# Patient Record
Sex: MALE | Race: WHITE | Employment: OTHER | URBAN - NONMETROPOLITAN AREA
[De-identification: names, ages, dates, MRNs, and addresses within clinical notes are randomized per-mention and may not be internally consistent; named-entity substitution may affect disease eponyms.]

---

## 2019-05-31 ENCOUNTER — HOSPITAL ENCOUNTER (INPATIENT)
Age: 74
LOS: 3 days | Discharge: ANOTHER HOSPITAL | DRG: 193 | End: 2019-06-03
Attending: FAMILY MEDICINE | Admitting: INTERNAL MEDICINE
Payer: MEDICARE

## 2019-05-31 ENCOUNTER — HOSPITAL ENCOUNTER (EMERGENCY)
Dept: GENERAL RADIOLOGY | Age: 74
Discharge: HOME | DRG: 193 | End: 2019-05-31
Payer: MEDICARE

## 2019-05-31 DIAGNOSIS — I50.9 CONGESTIVE HEART FAILURE, UNSPECIFIED HF CHRONICITY, UNSPECIFIED HEART FAILURE TYPE (HCC): ICD-10-CM

## 2019-05-31 DIAGNOSIS — J18.9 COMMUNITY ACQUIRED PNEUMONIA OF LEFT LOWER LOBE OF LUNG: ICD-10-CM

## 2019-05-31 DIAGNOSIS — R09.02 HYPOXIA: Primary | ICD-10-CM

## 2019-05-31 LAB
BASOPHILS # BLD: 0.7 %
BASOPHILS ABSOLUTE: 0 THOU/MM3 (ref 0–0.1)
BUN WHOLE BLOOD, UC: 26 MG/DL (ref 8–26)
CHLORIDE, WHOLE BLOOD: 98 MEQ/L (ref 98–109)
CO2, WHOLE BLOOD: 26 MEQ/L (ref 23–33)
CREATININE WHOLE BLOOD, UC: 1.2 MG/DL (ref 0.5–1.2)
EOSINOPHIL # BLD: 0.7 %
EOSINOPHILS ABSOLUTE: 0 THOU/MM3 (ref 0–0.4)
FLU A ANTIGEN: NEGATIVE
GFR, ESTIMATED: 63 ML/MIN/1.73M2
GLUCOSE, WHOLE BLOOD: 167 MG/DL (ref 70–108)
HCT VFR BLD CALC: 47.2 % (ref 42–52)
HEMOGLOBIN: 15.5 GM/DL (ref 14–18)
IMMATURE GRANS (ABS): 0.04 THOU/MM3 (ref 0–0.07)
IMMATURE GRANULOCYTES: 0.7 %
INFLUENZA B AG, EIA: NEGATIVE
LYMPHOCYTES # BLD: 23.8 %
LYMPHOCYTES ABSOLUTE: 1.4 THOU/MM3 (ref 1–4.8)
MCH RBC QN AUTO: 30.3 PG (ref 27–31)
MCHC RBC AUTO-ENTMCNC: 32.8 GM/DL (ref 33–37)
MCV RBC AUTO: 92 FL (ref 80–94)
MONOCYTES # BLD: 14.9 %
MONOCYTES ABSOLUTE: 0.9 THOU/MM3 (ref 0.4–1.3)
NUCLEATED RED BLOOD CELLS: 0 /100 WBC
PDW BLD-RTO: 11.9 % (ref 11.5–14.5)
PLATELET # BLD: 160 THOU/MM3 (ref 130–400)
PMV BLD AUTO: 7.8 FL (ref 7.4–10.4)
POTASSIUM, WHOLE BLOOD: 3.7 MEQ/L (ref 3.5–4.9)
PROCALCITONIN: 0.09 NG/ML (ref 0.01–0.09)
RBC # BLD: 5.11 MILL/MM3 (ref 4.7–6.1)
SEG NEUTROPHILS: 59.2 %
SEGMENTED NEUTROPHILS ABSOLUTE COUNT: 3.4 THOU/MM3 (ref 1.8–7.7)
SODIUM, WHOLE BLOOD: 139 MEQ/L (ref 138–146)
WBC # BLD: 5.8 THOU/MM3 (ref 4.8–10.8)

## 2019-05-31 PROCEDURE — 2580000003 HC RX 258: Performed by: INTERNAL MEDICINE

## 2019-05-31 PROCEDURE — 2700000000 HC OXYGEN THERAPY PER DAY

## 2019-05-31 PROCEDURE — 99203 OFFICE O/P NEW LOW 30 MIN: CPT | Performed by: NURSE PRACTITIONER

## 2019-05-31 PROCEDURE — 71046 X-RAY EXAM CHEST 2 VIEWS: CPT

## 2019-05-31 PROCEDURE — 6360000002 HC RX W HCPCS: Performed by: FAMILY MEDICINE

## 2019-05-31 PROCEDURE — 94760 N-INVAS EAR/PLS OXIMETRY 1: CPT

## 2019-05-31 PROCEDURE — 94640 AIRWAY INHALATION TREATMENT: CPT

## 2019-05-31 PROCEDURE — 1200000003 HC TELEMETRY R&B

## 2019-05-31 PROCEDURE — 84145 PROCALCITONIN (PCT): CPT

## 2019-05-31 PROCEDURE — 82565 ASSAY OF CREATININE: CPT

## 2019-05-31 PROCEDURE — 2580000003 HC RX 258: Performed by: FAMILY MEDICINE

## 2019-05-31 PROCEDURE — 36415 COLL VENOUS BLD VENIPUNCTURE: CPT

## 2019-05-31 PROCEDURE — 84520 ASSAY OF UREA NITROGEN: CPT

## 2019-05-31 PROCEDURE — 99223 1ST HOSP IP/OBS HIGH 75: CPT | Performed by: INTERNAL MEDICINE

## 2019-05-31 PROCEDURE — 85025 COMPLETE CBC W/AUTO DIFF WBC: CPT

## 2019-05-31 PROCEDURE — 99284 EMERGENCY DEPT VISIT MOD MDM: CPT

## 2019-05-31 PROCEDURE — 99205 OFFICE O/P NEW HI 60 MIN: CPT

## 2019-05-31 PROCEDURE — 87040 BLOOD CULTURE FOR BACTERIA: CPT

## 2019-05-31 PROCEDURE — 2709999900 HC NON-CHARGEABLE SUPPLY

## 2019-05-31 PROCEDURE — 6360000002 HC RX W HCPCS: Performed by: INTERNAL MEDICINE

## 2019-05-31 PROCEDURE — 80051 ELECTROLYTE PANEL: CPT

## 2019-05-31 PROCEDURE — 87804 INFLUENZA ASSAY W/OPTIC: CPT

## 2019-05-31 PROCEDURE — 6370000000 HC RX 637 (ALT 250 FOR IP): Performed by: NURSE PRACTITIONER

## 2019-05-31 PROCEDURE — 82947 ASSAY GLUCOSE BLOOD QUANT: CPT

## 2019-05-31 PROCEDURE — 6370000000 HC RX 637 (ALT 250 FOR IP): Performed by: INTERNAL MEDICINE

## 2019-05-31 PROCEDURE — 6360000002 HC RX W HCPCS: Performed by: NURSE PRACTITIONER

## 2019-05-31 RX ORDER — POTASSIUM CHLORIDE 20 MEQ/1
40 TABLET, EXTENDED RELEASE ORAL PRN
Status: DISCONTINUED | OUTPATIENT
Start: 2019-05-31 | End: 2019-06-03 | Stop reason: HOSPADM

## 2019-05-31 RX ORDER — ONDANSETRON 2 MG/ML
4 INJECTION INTRAMUSCULAR; INTRAVENOUS EVERY 6 HOURS PRN
Status: DISCONTINUED | OUTPATIENT
Start: 2019-05-31 | End: 2019-06-03 | Stop reason: HOSPADM

## 2019-05-31 RX ORDER — TERAZOSIN 1 MG/1
2 CAPSULE ORAL NIGHTLY
Status: DISCONTINUED | OUTPATIENT
Start: 2019-06-01 | End: 2019-06-03 | Stop reason: HOSPADM

## 2019-05-31 RX ORDER — ALBUTEROL SULFATE 2.5 MG/3ML
2.5 SOLUTION RESPIRATORY (INHALATION) ONCE
Status: COMPLETED | OUTPATIENT
Start: 2019-05-31 | End: 2019-05-31

## 2019-05-31 RX ORDER — SODIUM CHLORIDE 0.9 % (FLUSH) 0.9 %
10 SYRINGE (ML) INJECTION EVERY 12 HOURS SCHEDULED
Status: DISCONTINUED | OUTPATIENT
Start: 2019-05-31 | End: 2019-06-03 | Stop reason: HOSPADM

## 2019-05-31 RX ORDER — FAMOTIDINE 20 MG/1
20 TABLET, FILM COATED ORAL 2 TIMES DAILY
Status: DISCONTINUED | OUTPATIENT
Start: 2019-05-31 | End: 2019-06-03 | Stop reason: HOSPADM

## 2019-05-31 RX ORDER — PRAVASTATIN SODIUM 40 MG
40 TABLET ORAL DAILY
Status: DISCONTINUED | OUTPATIENT
Start: 2019-06-01 | End: 2019-06-03 | Stop reason: HOSPADM

## 2019-05-31 RX ORDER — PRAVASTATIN SODIUM 40 MG
20 TABLET ORAL DAILY
COMMUNITY
End: 2022-09-22

## 2019-05-31 RX ORDER — POTASSIUM CHLORIDE 7.45 MG/ML
10 INJECTION INTRAVENOUS PRN
Status: DISCONTINUED | OUTPATIENT
Start: 2019-05-31 | End: 2019-06-03 | Stop reason: HOSPADM

## 2019-05-31 RX ORDER — POLYETHYLENE GLYCOL 3350 17 G/17G
17 POWDER, FOR SOLUTION ORAL DAILY PRN
Status: DISCONTINUED | OUTPATIENT
Start: 2019-06-01 | End: 2019-06-03 | Stop reason: HOSPADM

## 2019-05-31 RX ORDER — LISINOPRIL 40 MG/1
40 TABLET ORAL DAILY
COMMUNITY
End: 2022-09-22 | Stop reason: SDUPTHER

## 2019-05-31 RX ORDER — ACETAMINOPHEN 325 MG/1
650 TABLET ORAL EVERY 4 HOURS PRN
Status: DISCONTINUED | OUTPATIENT
Start: 2019-05-31 | End: 2019-06-03 | Stop reason: HOSPADM

## 2019-05-31 RX ORDER — IPRATROPIUM BROMIDE AND ALBUTEROL SULFATE 2.5; .5 MG/3ML; MG/3ML
1 SOLUTION RESPIRATORY (INHALATION) ONCE
Status: COMPLETED | OUTPATIENT
Start: 2019-05-31 | End: 2019-05-31

## 2019-05-31 RX ORDER — ATENOLOL 25 MG/1
25 TABLET ORAL DAILY
Status: DISCONTINUED | OUTPATIENT
Start: 2019-06-01 | End: 2019-06-03 | Stop reason: HOSPADM

## 2019-05-31 RX ORDER — TERAZOSIN 2 MG/1
2 CAPSULE ORAL NIGHTLY
COMMUNITY

## 2019-05-31 RX ORDER — LISINOPRIL 40 MG/1
40 TABLET ORAL DAILY
Status: DISCONTINUED | OUTPATIENT
Start: 2019-06-01 | End: 2019-06-03 | Stop reason: HOSPADM

## 2019-05-31 RX ORDER — AMLODIPINE BESYLATE 10 MG/1
10 TABLET ORAL DAILY
Status: DISCONTINUED | OUTPATIENT
Start: 2019-06-01 | End: 2019-06-03 | Stop reason: HOSPADM

## 2019-05-31 RX ORDER — AMLODIPINE BESYLATE 10 MG/1
10 TABLET ORAL DAILY
COMMUNITY
End: 2022-09-22

## 2019-05-31 RX ORDER — METHYLPREDNISOLONE SODIUM SUCCINATE 125 MG/2ML
125 INJECTION, POWDER, LYOPHILIZED, FOR SOLUTION INTRAMUSCULAR; INTRAVENOUS ONCE
Status: COMPLETED | OUTPATIENT
Start: 2019-05-31 | End: 2019-05-31

## 2019-05-31 RX ORDER — SODIUM CHLORIDE 0.9 % (FLUSH) 0.9 %
10 SYRINGE (ML) INJECTION PRN
Status: DISCONTINUED | OUTPATIENT
Start: 2019-05-31 | End: 2019-06-03 | Stop reason: HOSPADM

## 2019-05-31 RX ORDER — ATENOLOL 25 MG/1
25 TABLET ORAL DAILY
COMMUNITY
End: 2019-08-05

## 2019-05-31 RX ORDER — SODIUM CHLORIDE 9 MG/ML
INJECTION, SOLUTION INTRAVENOUS CONTINUOUS
Status: DISCONTINUED | OUTPATIENT
Start: 2019-05-31 | End: 2019-06-02

## 2019-05-31 RX ORDER — IPRATROPIUM BROMIDE AND ALBUTEROL SULFATE 2.5; .5 MG/3ML; MG/3ML
1 SOLUTION RESPIRATORY (INHALATION)
Status: DISCONTINUED | OUTPATIENT
Start: 2019-05-31 | End: 2019-06-03 | Stop reason: HOSPADM

## 2019-05-31 RX ADMIN — IPRATROPIUM BROMIDE AND ALBUTEROL SULFATE 1 AMPULE: .5; 3 SOLUTION RESPIRATORY (INHALATION) at 12:55

## 2019-05-31 RX ADMIN — SODIUM CHLORIDE: 9 INJECTION, SOLUTION INTRAVENOUS at 20:45

## 2019-05-31 RX ADMIN — FAMOTIDINE 20 MG: 20 TABLET ORAL at 20:58

## 2019-05-31 RX ADMIN — CEFTRIAXONE SODIUM 1 G: 1 INJECTION, POWDER, FOR SOLUTION INTRAMUSCULAR; INTRAVENOUS at 16:00

## 2019-05-31 RX ADMIN — ENOXAPARIN SODIUM 40 MG: 40 INJECTION SUBCUTANEOUS at 20:58

## 2019-05-31 RX ADMIN — AZITHROMYCIN MONOHYDRATE 500 MG: 500 INJECTION, POWDER, LYOPHILIZED, FOR SOLUTION INTRAVENOUS at 14:39

## 2019-05-31 RX ADMIN — ALBUTEROL SULFATE 2.5 MG: 2.5 SOLUTION RESPIRATORY (INHALATION) at 14:04

## 2019-05-31 RX ADMIN — IPRATROPIUM BROMIDE AND ALBUTEROL SULFATE 1 AMPULE: .5; 3 SOLUTION RESPIRATORY (INHALATION) at 22:34

## 2019-05-31 RX ADMIN — METHYLPREDNISOLONE SODIUM SUCCINATE 125 MG: 125 INJECTION, POWDER, FOR SOLUTION INTRAMUSCULAR; INTRAVENOUS at 13:06

## 2019-05-31 RX ADMIN — Medication 10 ML: at 20:55

## 2019-05-31 SDOH — HEALTH STABILITY: MENTAL HEALTH: HOW OFTEN DO YOU HAVE A DRINK CONTAINING ALCOHOL?: NEVER

## 2019-05-31 ASSESSMENT — ENCOUNTER SYMPTOMS
DIARRHEA: 0
WHEEZING: 0
VOMITING: 0
ABDOMINAL PAIN: 0
RHINORRHEA: 0
FACIAL SWELLING: 0
STRIDOR: 0
NAUSEA: 0
TROUBLE SWALLOWING: 0
SHORTNESS OF BREATH: 0
BACK PAIN: 0
SINUS CONGESTION: 1
SORE THROAT: 0
SINUS PRESSURE: 0
EYE REDNESS: 0
WHEEZING: 1
VOICE CHANGE: 0
RHINORRHEA: 1
CHEST TIGHTNESS: 1
EYE DISCHARGE: 0
COUGH: 1

## 2019-05-31 NOTE — ED PROVIDER NOTES
[facility]  eMERGENCY dEPARTMENT eNCOUnter          CHIEF COMPLAINT       Chief Complaint   Patient presents with    Cough       Nurses Notes reviewed and I agree except as noted in the HPI. HISTORY OF PRESENT ILLNESS    Enrique Macias is a 75 y.o. male who presents to the Emergency Department via EMS for the evaluation of a cough which the patient reports has been present for the past 5 days. Due to continuation of his cough, the patient states to have gone to urgent care where lab work and a Chest XR was completed which revealed pneumonia. Patient was transferred to the ED for further evaluation and work up. He was treated with solumedrol, a duoneb breathing treatment, and an albuterol breathing treatment before arrival to the ED. Patient was transferred to the ED on 3L of O2 via NC. He states to be experiencing congestion in addition to his cough but denies any rhinorrhea, otalgia, pharyngitis, fever, chills, nausea, vomiting, chest pain, or shortness of breath. No medical history of COPD or asthma is reported. The patient reports no recent travel or exposure to anyone with similar symptoms. The patient reports that his son was diagnosed with legionella one year ago due to washing his farm equipment with well water. Patient reports that his farm equipment was recently watched with the same well water. Patient denies a social history of smoking. The patient reports no additional symptoms or complaints at this time. The HPI was provided by the patient. REVIEW OF SYSTEMS     Review of Systems   Constitutional: Negative for appetite change, chills, fatigue and fever. HENT: Positive for congestion (nasal). Negative for ear pain, rhinorrhea and sore throat. Eyes: Negative for discharge, redness and visual disturbance. Respiratory: Positive for cough. Negative for shortness of breath and wheezing. Cardiovascular: Negative for chest pain, palpitations and leg swelling. °F (37.2 °C). His blood pressure is 133/72 and his pulse is 67. His respiration is 20 and oxygen saturation is 93%. Physical Exam   Constitutional: He is oriented to person, place, and time. He appears well-developed and well-nourished. Non-toxic appearance. Nasal cannula in place. HENT:   Head: Normocephalic and atraumatic. Right Ear: Tympanic membrane and external ear normal.   Left Ear: Tympanic membrane and external ear normal.   Nose: Nose normal.   Mouth/Throat: Oropharynx is clear and moist and mucous membranes are normal. No oropharyngeal exudate, posterior oropharyngeal edema or posterior oropharyngeal erythema. Eyes: Conjunctivae and EOM are normal.   Neck: Normal range of motion. Neck supple. No JVD present. Cardiovascular: Normal rate, regular rhythm, normal heart sounds, intact distal pulses and normal pulses. Exam reveals no gallop and no friction rub. No murmur heard. Pulmonary/Chest: He is in respiratory distress. He has decreased breath sounds. He has wheezes. He has no rhonchi. He has no rales. Abdominal: Soft. Bowel sounds are normal. He exhibits no distension. There is no tenderness. There is no rebound, no guarding and no CVA tenderness. Musculoskeletal: Normal range of motion. He exhibits no edema. Neurological: He is alert and oriented to person, place, and time. He exhibits normal muscle tone. Coordination normal.   Skin: Skin is warm and dry. No rash noted. He is not diaphoretic. Nursing note and vitals reviewed.       DIFFERENTIALDIAGNOSIS:   Include and are not limited to: pneumonia, bronchitis, URI, PE, viral illness    DIAGNOSTICRESULTS     EKG: All EKG's are interpreted by the Emergency Department Physician who either signs or Co-signs this chart in the absence of acardiologist.  EKG interpreted by Beryle Margo, MD:    None    RADIOLOGY: non-plain film images(s) such as CT, Ultrasound and MRI are read by the radiologist.    XR CHEST STANDARD (2 VW)   Final imaging results and presenting respiratory failure I recommended admission and he was amenable to my decision. The case was discussed with Dr. Patricia Deng (internal medicine) who graciously accepts the patient for admission and further evaluation. CRITICAL CARE:   None     CONSULTS:  Dr. Patricia Deng (internal medicine)    PROCEDURES:  None     FINAL IMPRESSION      1. Hypoxia    2. Community acquired pneumonia of left lower lobe of lung [facility])          DISPOSITION/PLAN   Admission    PATIENT REFERRED TO:  [facility] ER  [address] [address]  Today  EMS    Estrellita Hurt MD  [address] [place] [phone]            DISCHARGE MEDICATIONS:  Current Discharge Medication List          (Please note that portions of this note were completed with a voice recognition program.  Efforts weremade to edit the dictations but occasionally words are mis-transcribed.)    Scribe:  Adela Ospina 5/31/19 2:25 PM Scribing for and in thepresence of Madi Umana MD.    Signed by: Kodak Dias, 05/31/19 7:39 PM    Provider:  I personally performed the services described in the documentation, reviewed and edited the documentation which was dictated to the scribe in my presence, and it accurately records my words andactions.     Madi Umana MD 5/31/19 7:39 PM       Madi Umana MD  05/31/19 0055

## 2019-05-31 NOTE — ED NOTES
Patient returned from xray and assessed at 88% on 2L NC. O2 increased to 3L NC continuous pulse ox on patient.  Wife at [facility], RN  05/31/19 0764

## 2019-05-31 NOTE — ED PROVIDER NOTES
[facility]      [address] [facility]       Chief Complaint   Patient presents with    Cough       Nurses Notes reviewed and I agree except as noted in the HPI. HISTORY OF PRESENT ILLNESS   Victor Hugo Shah is a 75 y.o. male who presents: The history is provided by the patient and the spouse. Cough   Cough characteristics:  Productive and hacking  Sputum characteristics:  Yellow  Severity:  Moderate  Onset quality:  Sudden  Duration:  5 days  Timing:  Intermittent  Progression:  Unchanged  Chronicity:  New  Smoker: no    Context: sick contacts (wife seen yesterday in urgent care for Bronchitis)    Relieved by:  Nothing  Worsened by:  Nothing  Ineffective treatments: Coricidin HBP yesterday. Associated symptoms: fever (low grade today 99.7), rhinorrhea, sinus congestion and wheezing    Associated symptoms: no chest pain, no chills, no diaphoresis, no ear fullness, no ear pain, no headaches, no myalgias, no rash, no shortness of breath and no sore throat    Risk factors: no recent infection and no recent travel        REVIEW OF SYSTEMS     Review of Systems   Constitutional: Positive for activity change, fatigue and fever (low grade today 99.7). Negative for appetite change, chills and diaphoresis. HENT: Positive for congestion and rhinorrhea. Negative for ear pain, facial swelling, hearing loss, postnasal drip, sinus pressure, sore throat, trouble swallowing and voice change. Eyes: Negative for visual disturbance. Respiratory: Positive for cough, chest tightness and wheezing. Negative for shortness of breath and stridor. Cardiovascular: Negative for chest pain, palpitations and leg swelling. Gastrointestinal: Negative for abdominal pain, nausea and vomiting. Genitourinary: Negative for dysuria. Musculoskeletal: Negative for arthralgias and myalgias. Skin: Negative for rash. Neurological: Negative for dizziness, numbness and headaches.    Hematological: Negative for adenopathy. Psychiatric/Behavioral: The patient is not nervous/anxious. PAST MEDICAL HISTORY         Diagnosis Date    Stroke [facility])     2008       SURGICAL HISTORY     Patient  has no past surgical history on file. CURRENT MEDICATIONS       Previous Medications    AMLODIPINE (NORVASC) 10 MG TABLET    Take 10 mg by mouth daily    ASPIRIN 325 MG EC TABLET    Take 325 mg by mouth daily    ATENOLOL (TENORMIN) 25 MG TABLET    Take 25 mg by mouth daily    CHLORPHENIRAMINE-ACETAMINOPHEN (CORICIDIN HBP COLD/FLU PO)    Take by mouth    LISINOPRIL (PRINIVIL;ZESTRIL) 40 MG TABLET    Take 40 mg by mouth daily    PRAVASTATIN (PRAVACHOL) 40 MG TABLET    Take 40 mg by mouth daily    TERAZOSIN (HYTRIN) 2 MG CAPSULE    Take 2 mg by mouth nightly       ALLERGIES     Patient is has No Known Allergies. FAMILY HISTORY     Patient's family history is not on file. SOCIAL HISTORY     Patient  reports that he has never smoked. He has never used smokeless tobacco. He reports that he does not drink alcohol or use drugs. PHYSICAL EXAM     ED TRIAGE VITALS  BP: (!) 148/71, Temp: 99.7 °F (37.6 °C), Pulse: 70, Resp: 18, SpO2: 91 %  Physical Exam   Constitutional: He is oriented to person, place, and time. He appears well-developed and well-nourished. Non-toxic appearance. He does not have a sickly appearance. He does not appear ill. No distress. HENT:   Head: Normocephalic and atraumatic. Right Ear: Hearing, tympanic membrane, external ear and ear canal normal.   Left Ear: Hearing, tympanic membrane, external ear and ear canal normal.   Nose: Nose normal.   Mouth/Throat: Uvula is midline, oropharynx is clear and moist and mucous membranes are normal.   Eyes: Right conjunctiva is not injected. Left conjunctiva is not injected. Pupils are equal.   Neck: Normal range of motion. Cardiovascular: Normal rate, regular rhythm and normal heart sounds. No murmur heard.   No extremity edema present Pulmonary/Chest: Effort normal. No accessory muscle usage or stridor. No tachypnea and no bradypnea. No respiratory distress. He has no decreased breath sounds. He has wheezes (expiratory) in the right upper field, the right middle field, the right lower field and the left upper field. He has rhonchi in the left lower field. He has rales. He exhibits no tenderness. Musculoskeletal:        Right knee: He exhibits normal range of motion. Left knee: He exhibits normal range of motion. Neurological: He is alert and oriented to person, place, and time. Skin: Skin is warm and dry. He is not diaphoretic. Psychiatric: He has a normal mood and affect. His behavior is normal.   Nursing note and vitals reviewed. DIAGNOSTIC RESULTS   Labs:  Results for orders placed or performed during the hospital encounter of 05/31/19   Rapid influenza A/B antigens   Result Value Ref Range    Flu A Antigen NEGATIVE NEGATIVE    Influenza B Ag, EIA NEGATIVE NEGATIVE   CBC Auto Differential   Result Value Ref Range    WBC 5.8 4.8 - 10.8 thou/mm3    RBC 5.11 4.70 - 6.10 mill/mm3    Hemoglobin 15.5 14.0 - 18.0 gm/dl    Hematocrit 47.2 42.0 - 52.0 %    MCV 92 80 - 94 fL    MCH 30.3 27.0 - 31.0 pg    MCHC 32.8 (L) 33.0 - 37.0 gm/dl    RDW 11.9 11.5 - 14.5 %    Platelets 260 662 - 964 thou/mm3    MPV 7.8 7.4 - 10.4 fL   POC Basic Metabol Panel without Calcium   Result Value Ref Range    Sodium, Whole Blood 139 138 - 146 meq/l    Potassium, Whole Blood 3.7 3.5 - 4.9 meq/l    Chloride, Whole Blood 98 98 - 109 meq/l    CO2, WHOLE BLOOD 26 23 - 33 meq/l    Glucose, Whole Blood 167 (H) 70 - 108 mg/dl    BUN WHOLE BLOOD, UC 26 8 - 26 mg/dl    CREATININE WHOLE BLOOD, UC 1.2 0.5 - 1.2 mg/dl   Glomerular Filtration Rate, Estimated   Result Value Ref Range    GFR, Estimated 63 ml/min/1.73m2       IMAGING:  XR CHEST STANDARD (2 VW)   Final Result      Left basilar atelectasis/infiltrate.                **This report has been created using voice recognition software. It may contain minor errors which are inherent in voice recognition technology. **      Final report electronically signed by Dr. Jarrod Diaz on 5/31/2019 1:53 PM          URGENT CARE COURSE:     Vitals:    05/31/19 1313 05/31/19 1314 05/31/19 1330 05/31/19 1331   BP:       Pulse:       Resp:  16  18   Temp:       TempSrc:       SpO2: 91% 93% (!) 88% 91%   Weight:       Height:           Medications   albuterol (PROVENTIL) nebulizer solution 2.5 mg (has no administration in time range)   ipratropium-albuterol (DUONEB) nebulizer solution 1 ampule (1 ampule Inhalation Given 5/31/19 1255)   methylPREDNISolone sodium (SOLU-MEDROL) injection 125 mg (125 mg Intramuscular Given 5/31/19 1306)     PROCEDURES:  None  FINAL IMPRESSION       1. Hypoxia    2. Community acquired pneumonia of left lower lobe of lung ([facility] [address]        DISPOSITION/PLAN   DISPOSITION    1347 differentials discussed, need for oxygen and transfer to ER for further evaluation and diagnostic workup discussed as well as possible admission. Wife and patient voiced understanding  After 1 Duoneb aerosol tx scattered wheezing clearing, LLL with faint rhonci and exp wheeze. Requiring oxygen at 3 Liters nasal cannula to maintain O2 sat 92%. When ambulating on room air after initial Duoneb patients sat dropped from 94% to 88%.       1359- CXR read Left basilar infiltrate  Calling report to [facility] ER spoke to [facility]  EMS called out for transfer  Albuterol UD given per aerosol     1407- EMS here for transport , patient stablized prior to transfer    Lab Results   Component Value Date    WBC 5.8 05/31/2019    HGB 15.5 05/31/2019    HCT 47.2 05/31/2019     05/31/2019     Lab Results   Component Value Date     05/31/2019    K 3.7 05/31/2019        PATIENT REFERRED TO:  [facility] ER  [address]  [facility] [place] [id number]  [place]  EMS      DISCHARGE MEDICATIONS:  New Prescriptions    No medications on file

## 2019-05-31 NOTE — H&P
History & Physical        Patient:  Hardin Dancer  YOB: 1945    MRN: [id number]     Acct: [de-identified]    PCP: Elder Zarate MD    Date of Admission: 5/31/2019    Date of Service: Pt seen/examined on 5/31/2019 and Admitted to Inpatient with expected LOS greater than two midnights due to medical therapy. Chief Complaint:    Chief Complaint   Patient presents with    Cough         History Of Present Illness:      75 y.o. male who presented to [address] [place] [address] with \"evaluation of a cough which the patient reports has been present for the past 5 days. Due to continuation of his cough, the patient states to have gone to urgent care where lab work and a Chest XR was completed which revealed pneumonia. Patient was transferred to the ED for further evaluation and work up. He was treated with solumedrol, a duoneb breathing treatment, and an albuterol breathing treatment before arrival to the ED. Patient was transferred to the ED on 3L of O2 via NC. He states to be experiencing congestion in addition to his cough but denies any rhinorrhea, otalgia, pharyngitis, fever, chills, nausea, vomiting, chest pain, or shortness of breath. No medical history of COPD or asthma is reported. The patient reports no recent travel or exposure to anyone with similar symptoms. The patient reports that his son was diagnosed with legionella one year ago due to washing his farm equipment with well water. Patient reports that his farm equipment was recently watched with the same well water. \"-per er note and confirmed by myself    Addendum back deck was pressured washed with well water that has a hx of having legionella in it        Past Medical History:          Diagnosis Date    Stroke [facility])     2008       Past Surgical History:          Procedure Laterality Date    VEIN BYPASS SURGERY         Medications Prior to Admission:      Prior to Admission medications    Medication Sig Start Date End Date Taking? normal bowel sounds. No guarding, rebound. Musculoskeletal:  No clubbing, cyanosis or edema bilaterally. Full range of motion without deformity. Skin: Skin color, texture, turgor normal.  No rashes or lesions, or suspicious lesions. Neurologic:  Neurovascularly intact without any focal sensory/motor deficits. Cranial nerves: II-XII intact, grossly non-focal.  Psychiatric:  Alert and oriented, thought content appropriate, normal insight  Capillary Refill: Brisk,< 2 seconds   Peripheral Pulses: +2 palpable, equal bilaterally upper and lower extremities  Lymphatics: no lymphadenopathy      Labs:     Recent Labs     05/31/19  1331   WBC 5.8   HGB 15.5   HCT 47.2        Recent Labs     05/31/19  1331      K 3.7     No results for input(s): AST, ALT, BILIDIR, BILITOT, ALKPHOS in the last 72 hours. No results for input(s): INR in the last 72 hours. No results for input(s): Madalynn Duyen in the last 72 hours. Urinalysis:    No results found for: Eliel Sellers, BACTERIA, [address] [facility], [facility], [facility], [place] [facility]    Radiology:     CXR: I have reviewed the CXR with the following interpretation: lll infiltrate  EKG:  I have reviewed the EKG with the following interpretation:    Xr Chest Standard (2 Vw)    Result Date: 5/31/2019  PROCEDURE: XR CHEST (2 VW) CLINICAL INFORMATION: Cough and wheezing TECHNIQUE: PA and lateral chest radiographs. COMPARISON: None. FINDINGS: Median sternotomy wires are present in the mid thorax. Cardiomediastinal silhouette is within normal limits. Minimal reticular opacities are seen at the left lung base. There is a calcified granuloma in the left upper lung. Degenerative changes are present in the thoracic spine. Soft tissues are unremarkable. Left basilar atelectasis/infiltrate. **This report has been created using voice recognition software. It may contain minor errors which are inherent in voice recognition technology. ** Final report electronically signed by Dr. Francesco Camacho Travon Giraldo on 5/31/2019 1:53 PM           DVT prophylaxis: [x] Lovenox                                 [] SCDs                                 [] SQ Heparin                                 [] Encourage ambulation           [] Already on Anticoagulation    Code Status: No Order      PT/OT Eval Status:     Disposition:    [x] Home       [] TCU       [] Rehab       [] Psych       [] SNF       [] [place]       [] Other-    ASSESSMENT:    Active Hospital Problems    Diagnosis Date Noted    Pneumonia of lower lobe due to infectious organism ([facility] [address] [J18.1] 05/31/2019    Hypoxia [R09.02] 05/31/2019    Pneumonia [J18.9] 05/31/2019       PLAN:    1. Admit  2. Iv abx  3. Strong suspicion for legionella  4. Resume home meds  5. duonebs  6. Oxygen protocol        Thank you Colin Murillo MD for the opportunity to be involved in this patient's care.     Electronically signed by Mayda Henderson DO on 5/31/2019 at 3:57 PM

## 2019-05-31 NOTE — ED NOTES
Patient respirations 16/min, 2 L NC and wife at tableside. Continuous pulse ox on patient.     Patient to mike via wheelchair on 2L NC     Owen Funk RN  05/31/19 4317

## 2019-06-01 LAB
ANION GAP SERPL CALCULATED.3IONS-SCNC: 14 MEQ/L (ref 8–16)
AVERAGE GLUCOSE: 135 MG/DL (ref 70–126)
BASOPHILS # BLD: 0.1 %
BASOPHILS ABSOLUTE: 0 THOU/MM3 (ref 0–0.1)
BUN BLDV-MCNC: 25 MG/DL (ref 7–22)
CALCIUM SERPL-MCNC: 9 MG/DL (ref 8.5–10.5)
CHLORIDE BLD-SCNC: 101 MEQ/L (ref 98–111)
CO2: 24 MEQ/L (ref 23–33)
CREAT SERPL-MCNC: 0.9 MG/DL (ref 0.4–1.2)
EOSINOPHIL # BLD: 0 %
EOSINOPHILS ABSOLUTE: 0 THOU/MM3 (ref 0–0.4)
ERYTHROCYTE [DISTWIDTH] IN BLOOD BY AUTOMATED COUNT: 13.3 % (ref 11.5–14.5)
ERYTHROCYTE [DISTWIDTH] IN BLOOD BY AUTOMATED COUNT: 45 FL (ref 35–45)
GFR SERPL CREATININE-BSD FRML MDRD: 82 ML/MIN/1.73M2
GLUCOSE BLD-MCNC: 164 MG/DL (ref 70–108)
GLUCOSE BLD-MCNC: 180 MG/DL (ref 70–108)
HBA1C MFR BLD: 6.5 % (ref 4.4–6.4)
HCT VFR BLD CALC: 45.9 % (ref 42–52)
HEMOGLOBIN: 15.4 GM/DL (ref 14–18)
IMMATURE GRANS (ABS): 0.06 THOU/MM3 (ref 0–0.07)
IMMATURE GRANULOCYTES: 0.8 %
LYMPHOCYTES # BLD: 11.4 %
LYMPHOCYTES ABSOLUTE: 0.9 THOU/MM3 (ref 1–4.8)
MAGNESIUM: 2.1 MG/DL (ref 1.6–2.4)
MCH RBC QN AUTO: 30.8 PG (ref 26–33)
MCHC RBC AUTO-ENTMCNC: 33.6 GM/DL (ref 32.2–35.5)
MCV RBC AUTO: 91.8 FL (ref 80–94)
MONOCYTES # BLD: 4.2 %
MONOCYTES ABSOLUTE: 0.3 THOU/MM3 (ref 0.4–1.3)
NUCLEATED RED BLOOD CELLS: 0 /100 WBC
PLATELET # BLD: 173 THOU/MM3 (ref 130–400)
PMV BLD AUTO: 10.3 FL (ref 9.4–12.4)
POTASSIUM REFLEX MAGNESIUM: 3.7 MEQ/L (ref 3.5–5.2)
PROCALCITONIN: 0.08 NG/ML (ref 0.01–0.09)
RBC # BLD: 5 MILL/MM3 (ref 4.7–6.1)
SEG NEUTROPHILS: 83.5 %
SEGMENTED NEUTROPHILS ABSOLUTE COUNT: 6.6 THOU/MM3 (ref 1.8–7.7)
SODIUM BLD-SCNC: 139 MEQ/L (ref 135–145)
WBC # BLD: 7.9 THOU/MM3 (ref 4.8–10.8)

## 2019-06-01 PROCEDURE — 94640 AIRWAY INHALATION TREATMENT: CPT

## 2019-06-01 PROCEDURE — 84145 PROCALCITONIN (PCT): CPT

## 2019-06-01 PROCEDURE — 87449 NOS EACH ORGANISM AG IA: CPT

## 2019-06-01 PROCEDURE — 36415 COLL VENOUS BLD VENIPUNCTURE: CPT

## 2019-06-01 PROCEDURE — 1200000003 HC TELEMETRY R&B

## 2019-06-01 PROCEDURE — 99233 SBSQ HOSP IP/OBS HIGH 50: CPT | Performed by: INTERNAL MEDICINE

## 2019-06-01 PROCEDURE — 80048 BASIC METABOLIC PNL TOTAL CA: CPT

## 2019-06-01 PROCEDURE — 6370000000 HC RX 637 (ALT 250 FOR IP): Performed by: INTERNAL MEDICINE

## 2019-06-01 PROCEDURE — 82948 REAGENT STRIP/BLOOD GLUCOSE: CPT

## 2019-06-01 PROCEDURE — 94761 N-INVAS EAR/PLS OXIMETRY MLT: CPT

## 2019-06-01 PROCEDURE — 2580000003 HC RX 258: Performed by: INTERNAL MEDICINE

## 2019-06-01 PROCEDURE — 2700000000 HC OXYGEN THERAPY PER DAY

## 2019-06-01 PROCEDURE — 85025 COMPLETE CBC W/AUTO DIFF WBC: CPT

## 2019-06-01 PROCEDURE — 6360000002 HC RX W HCPCS: Performed by: INTERNAL MEDICINE

## 2019-06-01 PROCEDURE — 83036 HEMOGLOBIN GLYCOSYLATED A1C: CPT

## 2019-06-01 PROCEDURE — 83735 ASSAY OF MAGNESIUM: CPT

## 2019-06-01 PROCEDURE — 87899 AGENT NOS ASSAY W/OPTIC: CPT

## 2019-06-01 RX ORDER — DEXTROSE MONOHYDRATE 50 MG/ML
100 INJECTION, SOLUTION INTRAVENOUS PRN
Status: DISCONTINUED | OUTPATIENT
Start: 2019-06-01 | End: 2019-06-03 | Stop reason: HOSPADM

## 2019-06-01 RX ORDER — DEXTROSE MONOHYDRATE 25 G/50ML
12.5 INJECTION, SOLUTION INTRAVENOUS PRN
Status: DISCONTINUED | OUTPATIENT
Start: 2019-06-01 | End: 2019-06-03 | Stop reason: HOSPADM

## 2019-06-01 RX ORDER — NICOTINE POLACRILEX 4 MG
15 LOZENGE BUCCAL PRN
Status: DISCONTINUED | OUTPATIENT
Start: 2019-06-01 | End: 2019-06-03 | Stop reason: HOSPADM

## 2019-06-01 RX ADMIN — IPRATROPIUM BROMIDE AND ALBUTEROL SULFATE 1 AMPULE: .5; 3 SOLUTION RESPIRATORY (INHALATION) at 07:52

## 2019-06-01 RX ADMIN — IPRATROPIUM BROMIDE AND ALBUTEROL SULFATE 1 AMPULE: .5; 3 SOLUTION RESPIRATORY (INHALATION) at 21:34

## 2019-06-01 RX ADMIN — FAMOTIDINE 20 MG: 20 TABLET ORAL at 08:18

## 2019-06-01 RX ADMIN — TERAZOSIN HYDROCHLORIDE 2 MG: 1 CAPSULE ORAL at 20:14

## 2019-06-01 RX ADMIN — LISINOPRIL 40 MG: 40 TABLET ORAL at 08:18

## 2019-06-01 RX ADMIN — ATENOLOL 25 MG: 25 TABLET ORAL at 08:19

## 2019-06-01 RX ADMIN — AZITHROMYCIN MONOHYDRATE 500 MG: 500 INJECTION, POWDER, LYOPHILIZED, FOR SOLUTION INTRAVENOUS at 14:01

## 2019-06-01 RX ADMIN — CEFTRIAXONE SODIUM 1 G: 1 INJECTION, POWDER, FOR SOLUTION INTRAMUSCULAR; INTRAVENOUS at 16:42

## 2019-06-01 RX ADMIN — INSULIN LISPRO 1 UNITS: 100 INJECTION, SOLUTION INTRAVENOUS; SUBCUTANEOUS at 21:30

## 2019-06-01 RX ADMIN — ASPIRIN 325 MG: 325 TABLET, DELAYED RELEASE ORAL at 08:18

## 2019-06-01 RX ADMIN — SODIUM CHLORIDE: 9 INJECTION, SOLUTION INTRAVENOUS at 14:01

## 2019-06-01 RX ADMIN — IPRATROPIUM BROMIDE AND ALBUTEROL SULFATE 1 AMPULE: .5; 3 SOLUTION RESPIRATORY (INHALATION) at 15:33

## 2019-06-01 RX ADMIN — IPRATROPIUM BROMIDE AND ALBUTEROL SULFATE 1 AMPULE: .5; 3 SOLUTION RESPIRATORY (INHALATION) at 03:52

## 2019-06-01 RX ADMIN — FAMOTIDINE 20 MG: 20 TABLET ORAL at 20:14

## 2019-06-01 RX ADMIN — AMLODIPINE BESYLATE 10 MG: 10 TABLET ORAL at 08:18

## 2019-06-01 RX ADMIN — PRAVASTATIN SODIUM 40 MG: 40 TABLET ORAL at 20:14

## 2019-06-01 RX ADMIN — ENOXAPARIN SODIUM 40 MG: 40 INJECTION SUBCUTANEOUS at 20:14

## 2019-06-01 RX ADMIN — IPRATROPIUM BROMIDE AND ALBUTEROL SULFATE 1 AMPULE: .5; 3 SOLUTION RESPIRATORY (INHALATION) at 11:43

## 2019-06-01 ASSESSMENT — PAIN SCALES - GENERAL: PAINLEVEL_OUTOF10: 0

## 2019-06-01 NOTE — PROGRESS NOTES
Pt admitted to  6K11 from ED. Complaints: Pneumonia . IV site free of s/s of infection or infiltration. Vital signs obtained. Assessment and data collection initiated. Two nurse skin assessment performed by Zeb Stark RN and Baylee Arboleda RN. Oriented to room. Policies and procedures for 6K explained. All questions answered with no further questions at this time. Fall prevention and safety brochure discussed with patient. Bed alarm on. Call light in reach.

## 2019-06-01 NOTE — PROGRESS NOTES
Hospitalist Progress Note    Patient:  Monique Dowling      Unit/Bed:6K-11/011-A    YOB: 1945    MRN: [id number]       Acct: [de-identified]     PCP: Fredy Diana MD    Date of Admission: 5/31/2019    Assessment/Plan:    CAP with acute hypoxic respiratory insufficiency: CURB-65 score of 2, with hypoxia and CAD. Admitted. - on ceftriaxone and azithromycin. - urine strep/legionella pending.   - hypoxia improving with abx.  - breathing treatments for wheezing    CAD s/p CABG: Continue home meds    HTN: Continue home meds    Hyperglycemia: due to steroids? . Check A1c, SSI    Expected discharge date:  tomorrow    Disposition:    [x] Home       [] TCU       [] Rehab       [] Psych       [] SNF       [] [facility]       [] Other-    Chief Complaint: SOB    Hospital Course: 75 y.o. male who presented to [facility] with \"evaluation of a cough which the patient reports has been present for the past 5 days. Due to continuation of his cough, the patient states to have gone to urgent care where lab work and a Chest XR was completed which revealed pneumonia. Patient was transferred to the ED for further evaluation and work up. He was treated with solumedrol, a duoneb breathing treatment, and an albuterol breathing treatment before arrival to the ED. Patient was transferred to the ED on 3L of O2 via NC. He states to be experiencing congestion in addition to his cough but denies any rhinorrhea, otalgia, pharyngitis, fever, chills, nausea, vomiting, chest pain, or shortness of breath. No medical history of COPD or asthma is reported. The patient reports no recent travel or exposure to anyone with similar symptoms. The patient reports that his son was diagnosed with legionella one year ago due to washing his farm equipment with well water.  Patient reports that his farm equipment was recently watched with the same well water        Subjective (past 24 hours): Weaning oxygen, feeling improved, answered questions of wife and patient. Medications:  Reviewed    Infusion Medications    sodium chloride 50 mL/hr at 06/01/19 1401     Scheduled Medications    amLODIPine  10 mg Oral Daily    aspirin  325 mg Oral Daily    atenolol  25 mg Oral Daily    lisinopril  40 mg Oral Daily    pravastatin  40 mg Oral Daily    terazosin  2 mg Oral Nightly    sodium chloride flush  10 mL Intravenous 2 times per day    enoxaparin  40 mg Subcutaneous Q24H    famotidine  20 mg Oral BID    ipratropium-albuterol  1 ampule Inhalation Q4H WA    cefTRIAXone (ROCEPHIN) IV  1 g Intravenous Q24H    And    azithromycin  500 mg Intravenous Q24H     PRN Meds: sodium chloride flush, ondansetron, magnesium sulfate, potassium chloride **OR** potassium alternative oral replacement **OR** potassium chloride, polyethylene glycol, acetaminophen      Intake/Output Summary (Last 24 hours) at 6/1/2019 1955  Last data filed at 6/1/2019 1343  Gross per 24 hour   Intake 1437.3 ml   Output 200 ml   Net 1237.3 ml       Diet:  DIET CARB CONTROL; Exam:  /60   Pulse 73   Temp 98 °F (36.7 °C) (Oral)   Resp 20   Ht 5' 9\" (1.753 m)   Wt 250 lb 6.4 oz (113.6 kg)   SpO2 95%   BMI 36.98 kg/m²     General appearance: No apparent distress, appears stated age and cooperative. HEENT: Pupils equal, round, and reactive to light. Conjunctivae/corneas clear. Neck: Supple, with full range of motion. No jugular venous distention. Trachea midline. Respiratory:  Mild rhonchi in LLL  Cardiovascular: Regular rate and rhythm with normal S1/S2 without murmurs, rubs or gallops. Abdomen: Soft, non-tender, non-distended with normal bowel sounds. Musculoskeletal: passive and active ROM x 4 extremities. Skin: Skin color, texture, turgor normal.  No rashes or lesions. Neurologic:  Neurovascularly intact without any focal sensory/motor deficits.  Cranial nerves: II-XII intact, grossly non-focal.  Psychiatric: Alert and oriented, thought content appropriate, normal insight  Capillary Refill: Brisk,< 3 seconds   Peripheral Pulses: +2 palpable, equal bilaterally       Labs:   Recent Labs     05/31/19  1331 06/01/19  0552   WBC 5.8 7.9   HGB 15.5 15.4   HCT 47.2 45.9    173     Recent Labs     05/31/19  1331 06/01/19  0552    139   K 3.7 3.7   CL  --  101   CO2  --  24   BUN  --  25*   CREATININE  --  0.9   CALCIUM  --  9.0     No results for input(s): AST, ALT, BILIDIR, BILITOT, ALKPHOS in the last 72 hours. No results for input(s): INR in the last 72 hours. No results for input(s): Sonya Geller in the last 72 hours. Microbiology:      Urinalysis:    No results found for: Adrian Wooten, BACTERIA, RBCUA, BLOODU, [address], [place] [address]    Radiology:  XR CHEST STANDARD (2 VW)   Final Result      Left basilar atelectasis/infiltrate. **This report has been created using voice recognition software. It may contain minor errors which are inherent in voice recognition technology. **      Final report electronically signed by Dr. Nato Cunningham on 5/31/2019 1:53 PM          DVT prophylaxis: [x] Lovenox                                 [] SCDs                                 [] SQ Heparin                                 [] Encourage ambulation           [] Already on Anticoagulation     Code Status: Full Code    PT/OT Eval Status:   Tele:   [x] yes             [] no    Active Hospital Problems    Diagnosis Date Noted    [facility] [J18.1] 05/31/2019    Hypoxia [R09.02] 05/31/2019    Pneumonia [J18.9] 05/31/2019       Electronically signed by Leslie Shah MD on 6/1/2019 at 7:55 PM

## 2019-06-01 NOTE — PLAN OF CARE
Problem: Discharge Planning:  Goal: Discharged to appropriate level of care  Description  Discharged to appropriate level of care  Outcome: Ongoing  Note:   Plans home with wife at d/c     Problem: Airway Clearance - Ineffective:  Goal: Clear lung sounds  Description  Clear lung sounds  Outcome: Ongoing  Note:   Weaned to room air, lungs clear with exp wheezes in the bases, receiving breathing tx and IV Rocephin and Zithromax for PNA      Problem: Gas Exchange - Impaired:  Goal: Levels of oxygenation will improve  Description  Levels of oxygenation will improve  Outcome: Ongoing  Note:   On room air, breathing tx     Problem: Falls - Risk of:  Goal: Will remain free from falls  Description  Will remain free from falls  Outcome: Ongoing  Note:   No falls noted this shift. Continue falling star program. Bed alarm on, bed in low position. Call light and personal belongings in reach. Patient uses call light appropriately. Care plan reviewed with patient. Patient verbalize understanding of the plan of care and contribute to goal setting.

## 2019-06-02 ENCOUNTER — APPOINTMENT (OUTPATIENT)
Dept: GENERAL RADIOLOGY | Age: 74
DRG: 193 | End: 2019-06-02
Payer: MEDICARE

## 2019-06-02 LAB
ANION GAP SERPL CALCULATED.3IONS-SCNC: 12 MEQ/L (ref 8–16)
BUN BLDV-MCNC: 25 MG/DL (ref 7–22)
CALCIUM SERPL-MCNC: 8.9 MG/DL (ref 8.5–10.5)
CHLORIDE BLD-SCNC: 101 MEQ/L (ref 98–111)
CO2: 28 MEQ/L (ref 23–33)
CREAT SERPL-MCNC: 1 MG/DL (ref 0.4–1.2)
GFR SERPL CREATININE-BSD FRML MDRD: 73 ML/MIN/1.73M2
GLUCOSE BLD-MCNC: 108 MG/DL (ref 70–108)
GLUCOSE BLD-MCNC: 117 MG/DL (ref 70–108)
GLUCOSE BLD-MCNC: 122 MG/DL (ref 70–108)
GLUCOSE BLD-MCNC: 137 MG/DL (ref 70–108)
GLUCOSE BLD-MCNC: 198 MG/DL (ref 70–108)
LEGIONELLA URINARY AG: NEGATIVE
POTASSIUM SERPL-SCNC: 4.1 MEQ/L (ref 3.5–5.2)
PRO-BNP: 502.5 PG/ML (ref 0–1800)
SODIUM BLD-SCNC: 141 MEQ/L (ref 135–145)
STREP PNEUMO AG, UR: NEGATIVE

## 2019-06-02 PROCEDURE — 36415 COLL VENOUS BLD VENIPUNCTURE: CPT

## 2019-06-02 PROCEDURE — 6360000002 HC RX W HCPCS: Performed by: INTERNAL MEDICINE

## 2019-06-02 PROCEDURE — 83880 ASSAY OF NATRIURETIC PEPTIDE: CPT

## 2019-06-02 PROCEDURE — 94640 AIRWAY INHALATION TREATMENT: CPT

## 2019-06-02 PROCEDURE — 6370000000 HC RX 637 (ALT 250 FOR IP): Performed by: INTERNAL MEDICINE

## 2019-06-02 PROCEDURE — 94760 N-INVAS EAR/PLS OXIMETRY 1: CPT

## 2019-06-02 PROCEDURE — 99233 SBSQ HOSP IP/OBS HIGH 50: CPT | Performed by: INTERNAL MEDICINE

## 2019-06-02 PROCEDURE — 1200000003 HC TELEMETRY R&B

## 2019-06-02 PROCEDURE — 71045 X-RAY EXAM CHEST 1 VIEW: CPT

## 2019-06-02 PROCEDURE — 82948 REAGENT STRIP/BLOOD GLUCOSE: CPT

## 2019-06-02 PROCEDURE — 80048 BASIC METABOLIC PNL TOTAL CA: CPT

## 2019-06-02 PROCEDURE — 2580000003 HC RX 258: Performed by: INTERNAL MEDICINE

## 2019-06-02 RX ORDER — FUROSEMIDE 10 MG/ML
40 INJECTION INTRAMUSCULAR; INTRAVENOUS ONCE
Status: COMPLETED | OUTPATIENT
Start: 2019-06-02 | End: 2019-06-02

## 2019-06-02 RX ADMIN — FAMOTIDINE 20 MG: 20 TABLET ORAL at 21:52

## 2019-06-02 RX ADMIN — ATENOLOL 25 MG: 25 TABLET ORAL at 07:29

## 2019-06-02 RX ADMIN — AZITHROMYCIN MONOHYDRATE 500 MG: 500 INJECTION, POWDER, LYOPHILIZED, FOR SOLUTION INTRAVENOUS at 13:39

## 2019-06-02 RX ADMIN — Medication 10 ML: at 21:52

## 2019-06-02 RX ADMIN — PRAVASTATIN SODIUM 40 MG: 40 TABLET ORAL at 21:52

## 2019-06-02 RX ADMIN — FUROSEMIDE 40 MG: 10 INJECTION, SOLUTION INTRAMUSCULAR; INTRAVENOUS at 13:40

## 2019-06-02 RX ADMIN — FAMOTIDINE 20 MG: 20 TABLET ORAL at 07:29

## 2019-06-02 RX ADMIN — IPRATROPIUM BROMIDE AND ALBUTEROL SULFATE 1 AMPULE: .5; 3 SOLUTION RESPIRATORY (INHALATION) at 12:17

## 2019-06-02 RX ADMIN — CEFTRIAXONE SODIUM 1 G: 1 INJECTION, POWDER, FOR SOLUTION INTRAMUSCULAR; INTRAVENOUS at 15:46

## 2019-06-02 RX ADMIN — SODIUM CHLORIDE: 9 INJECTION, SOLUTION INTRAVENOUS at 11:17

## 2019-06-02 RX ADMIN — IPRATROPIUM BROMIDE AND ALBUTEROL SULFATE 1 AMPULE: .5; 3 SOLUTION RESPIRATORY (INHALATION) at 16:04

## 2019-06-02 RX ADMIN — AMLODIPINE BESYLATE 10 MG: 10 TABLET ORAL at 07:29

## 2019-06-02 RX ADMIN — TERAZOSIN HYDROCHLORIDE 2 MG: 1 CAPSULE ORAL at 21:52

## 2019-06-02 RX ADMIN — LISINOPRIL 40 MG: 40 TABLET ORAL at 07:29

## 2019-06-02 RX ADMIN — IPRATROPIUM BROMIDE AND ALBUTEROL SULFATE 1 AMPULE: .5; 3 SOLUTION RESPIRATORY (INHALATION) at 20:02

## 2019-06-02 RX ADMIN — ASPIRIN 325 MG: 325 TABLET, DELAYED RELEASE ORAL at 07:29

## 2019-06-02 RX ADMIN — IPRATROPIUM BROMIDE AND ALBUTEROL SULFATE 1 AMPULE: .5; 3 SOLUTION RESPIRATORY (INHALATION) at 07:55

## 2019-06-02 RX ADMIN — ENOXAPARIN SODIUM 40 MG: 40 INJECTION SUBCUTANEOUS at 21:51

## 2019-06-02 ASSESSMENT — PAIN SCALES - GENERAL
PAINLEVEL_OUTOF10: 0
PAINLEVEL_OUTOF10: 0

## 2019-06-02 NOTE — PLAN OF CARE
Problem: Discharge Planning:  Goal: Discharged to appropriate level of care  Description  Discharged to appropriate level of care  6/2/2019 1222 by Aashish Torres RN  Outcome: Ongoing  Note:   Home with wife at d/c     Problem: Airway Clearance - Ineffective:  Goal: Clear lung sounds  Description  Clear lung sounds  6/2/2019 1222 by Aashish Torres RN  Outcome: Ongoing  Note:   Weaned off oxygen. Lungs clear and diminished. On IV Azithromycin and Rocephin for PNA, breathing tx     Problem: Gas Exchange - Impaired:  Goal: Levels of oxygenation will improve  Description  Levels of oxygenation will improve  6/2/2019 1222 by Aashish Torres RN  Outcome: Ongoing  Note:   On room air, breathing tx     Problem: Falls - Risk of:  Goal: Will remain free from falls  Description  Will remain free from falls  6/2/2019 1222 by Aashish Torres RN  Outcome: Ongoing  Note:   No falls noted this shift. Continue falling star program. Bed alarm on, bed in low position. Call light and personal belongings in reach. Patient uses call light appropriately. Care plan reviewed with patient. Patient verbalize understanding of the plan of care and contribute to goal setting.

## 2019-06-02 NOTE — PROGRESS NOTES
Hospitalist Progress Note    Patient:  Melissa Goss      Unit/Bed:6K-11/011-A    YOB: 1945    MRN: [id number]       Acct: [de-identified]     PCP: Cesar Little MD    Date of Admission: 5/31/2019    Assessment/Plan:    CAP with acute hypoxic respiratory insufficiency: CURB-65 score of 2, with hypoxia and CAD. Admitted. LLL reticular infiltrates. His PNA presentation isn't impressive, suspect CHF componnet as well  - on ceftriaxone and azithromycin. - urine strep/legionella pending.   - hypoxia improving with abx.  - breathing treatments for wheezing    Suspect Acute CHF: LE edema, +JVD, bibasilar crackles. History of CAD. - check pro-BNP  - echo  - repeat CXR  - trial lasix. CAD s/p CABG: Continue home meds    HTN: Continue home meds    Hyperglycemia: due to steroids? . A1c 6.5. SSI    Expected discharge date:  tomorrow    Disposition:    [x] Home       [] TCU       [] Rehab       [] Psych       [] SNF       [] [facility]       [] Other-    Chief Complaint: SOB    Hospital Course: 75 y.o. male who presented to [address] [place] [address] with \"evaluation of a cough which the patient reports has been present for the past 5 days. Due to continuation of his cough, the patient states to have gone to urgent care where lab work and a Chest XR was completed which revealed pneumonia. Patient was transferred to the ED for further evaluation and work up. He was treated with solumedrol, a duoneb breathing treatment, and an albuterol breathing treatment before arrival to the ED. Patient was transferred to the ED on 3L of O2 via NC. He states to be experiencing congestion in addition to his cough but denies any rhinorrhea, otalgia, pharyngitis, fever, chills, nausea, vomiting, chest pain, or shortness of breath.  No medical history of COPD or asthma is reported. The patient reports no recent travel or exposure to anyone with similar symptoms. The patient reports that his son was diagnosed with legionella one year ago due to washing his farm equipment with well water. Patient reports that his farm equipment was recently watched with the same well water        Subjective (past 24 hours): Oxygen weaned but decreased functional capacity from baseline with signs of CHF. Medications:  Reviewed    Infusion Medications    dextrose       Scheduled Medications    furosemide  40 mg Intravenous Once    insulin lispro  0-12 Units Subcutaneous TID WC    insulin lispro  0-6 Units Subcutaneous Nightly    amLODIPine  10 mg Oral Daily    aspirin  325 mg Oral Daily    atenolol  25 mg Oral Daily    lisinopril  40 mg Oral Daily    pravastatin  40 mg Oral Daily    terazosin  2 mg Oral Nightly    sodium chloride flush  10 mL Intravenous 2 times per day    enoxaparin  40 mg Subcutaneous Q24H    famotidine  20 mg Oral BID    ipratropium-albuterol  1 ampule Inhalation Q4H WA    cefTRIAXone (ROCEPHIN) IV  1 g Intravenous Q24H    And    azithromycin  500 mg Intravenous Q24H     PRN Meds: glucose, dextrose, glucagon (rDNA), dextrose, sodium chloride flush, ondansetron, magnesium sulfate, potassium chloride **OR** potassium alternative oral replacement **OR** potassium chloride, polyethylene glycol, acetaminophen      Intake/Output Summary (Last 24 hours) at 6/2/2019 1323  Last data filed at 6/2/2019 8471  Gross per 24 hour   Intake 3167.6 ml   Output 0 ml   Net 3167.6 ml       Diet:  DIET CARB CONTROL; Exam:  BP (!) 146/77   Pulse 75   Temp 98.2 °F (36.8 °C) (Oral)   Resp 18   Ht 5' 9\" (1.753 m)   Wt 254 lb 11.2 oz (115.5 kg)   SpO2 92%   BMI 37.61 kg/m²     General appearance: No apparent distress, appears stated age and cooperative. HEENT: Pupils equal, round, and reactive to light. Conjunctivae/corneas clear. Neck: Supple, with full range of motion. + JVD Trachea midline.   Respiratory:  Bibasilar crackles  Cardiovascular: Regular rate and rhythm with normal S1/S2 without murmurs, rubs or gallops. 2+ LE edema  Abdomen: Soft, non-tender, non-distended with normal bowel sounds. Musculoskeletal: passive and active ROM x 4 extremities. Skin: Skin color, texture, turgor normal.  No rashes or lesions. Neurologic:  Neurovascularly intact without any focal sensory/motor deficits. Cranial nerves: II-XII intact, grossly non-focal.  Psychiatric: Alert and oriented, thought content appropriate, normal insight  Capillary Refill: Brisk,< 3 seconds   Peripheral Pulses: +2 palpable, equal bilaterally       Labs:   Recent Labs     05/31/19  1331 06/01/19  0552   WBC 5.8 7.9   HGB 15.5 15.4   HCT 47.2 45.9    173     Recent Labs     05/31/19  1331 06/01/19  0552    139   K 3.7 3.7   CL  --  101   CO2  --  24   BUN  --  25*   CREATININE  --  0.9   CALCIUM  --  9.0     No results for input(s): AST, ALT, BILIDIR, BILITOT, ALKPHOS in the last 72 hours. No results for input(s): INR in the last 72 hours. No results for input(s): Delcie Moore in the last 72 hours. Microbiology:      Urinalysis:    No results found for: Merribeth Karns, BACTERIA, RBCUA, BLOODU, [address], [place] [address]    Radiology:  XR CHEST STANDARD (2 VW)   Final Result      Left basilar atelectasis/infiltrate. **This report has been created using voice recognition software. It may contain minor errors which are inherent in voice recognition technology. **      Final report electronically signed by Dr. Clarissa Bobo on 5/31/2019 1:53 PM      XR CHEST PORTABLE    (Results Pending)       DVT prophylaxis: [x] Lovenox                                 [] SCDs                                 [] SQ Heparin                                 [] Encourage ambulation           [] Already on Anticoagulation     Code Status: Full Code    PT/OT Eval Status:   Tele:   [x] yes             [] no    Active Hospital Problems    Diagnosis Date Noted    Pneumonia of lower lobe due to infectious organism ([facility] [J18.1] 05/31/2019    Hypoxia [R09.02] 05/31/2019    Pneumonia [J18.9] 05/31/2019       Electronically signed by Johann Webster MD on 6/2/2019 at 1:23 PM

## 2019-06-02 NOTE — PLAN OF CARE
Problem: Discharge Planning:  Goal: Discharged to appropriate level of care  Description  Discharged to appropriate level of care  6/1/2019 2237 by Rian Mcnamara RN  Outcome: Ongoing  Note:   Pt plans to be discharged to home with wife. Will continue to monitor for further discharge needs. Problem: Discharge Planning:  Goal: Participates in care planning  Description  Participates in care planning  6/1/2019 2237 by Rian Mcnamara RN  Outcome: Ongoing  Note:   Pt participates in care planning to the best of ability. Will continue to include in care planning. Problem: Airway Clearance - Ineffective:  Goal: Clear lung sounds  Description  Clear lung sounds  6/1/2019 2237 by Rian Mcnamara RN  Outcome: Ongoing  Note:   Pt had clear and diminished lung sounds throughout lungs. Pt has scheduled breathing treatments. Will continue to monitor. Problem: Airway Clearance - Ineffective:  Goal: Ability to maintain a clear airway will improve  Description  Ability to maintain a clear airway will improve  6/1/2019 2237 by Rian Mcnamara RN  Outcome: Ongoing  Note:   Pt able to maintain own patent airway. Will continue to monitor. Problem: Gas Exchange - Impaired:  Goal: Levels of oxygenation will improve  Description  Levels of oxygenation will improve  6/1/2019 2237 by Rian Mcnamara RN  Outcome: Ongoing  Note:   Pt O2 sat 94% on room air. Will continue to monitor. Problem: Falls - Risk of:  Goal: Will remain free from falls  Description  Will remain free from falls  6/1/2019 2237 by Rian Mcnamara RN  Outcome: Ongoing  Note:   Pt free from falls this shift. Bed alarm on, 2/4 side rails up, call light in reach, fall band on, nonskid socks on, clear pathway, bed in locked and lowest position. Will continue to monitor.      Problem: Falls - Risk of:  Goal: Absence of physical injury  Description  Absence of physical injury  6/1/2019 2237 by Rian Mcnamara RN  Outcome: Ongoing  Note:   Pt free from physical injury this shift. Bed alarm on, 2/4 side rails up, call light in reach, fall band on, nonskid socks on, clear pathway, bed in locked and lowest position. Will continue to monitor. Care plan reviewed with patient and family. Patient and family verbalize understanding of the plan of care and contribute to goal setting.

## 2019-06-03 VITALS
DIASTOLIC BLOOD PRESSURE: 74 MMHG | TEMPERATURE: 97.9 F | RESPIRATION RATE: 18 BRPM | OXYGEN SATURATION: 96 % | BODY MASS INDEX: 37.01 KG/M2 | SYSTOLIC BLOOD PRESSURE: 147 MMHG | HEART RATE: 63 BPM | WEIGHT: 249.9 LBS | HEIGHT: 69 IN

## 2019-06-03 LAB
ANION GAP SERPL CALCULATED.3IONS-SCNC: 11 MEQ/L (ref 8–16)
BUN BLDV-MCNC: 26 MG/DL (ref 7–22)
CALCIUM SERPL-MCNC: 9.1 MG/DL (ref 8.5–10.5)
CHLORIDE BLD-SCNC: 101 MEQ/L (ref 98–111)
CO2: 30 MEQ/L (ref 23–33)
CREAT SERPL-MCNC: 1 MG/DL (ref 0.4–1.2)
GFR SERPL CREATININE-BSD FRML MDRD: 73 ML/MIN/1.73M2
GLUCOSE BLD-MCNC: 100 MG/DL (ref 70–108)
GLUCOSE BLD-MCNC: 105 MG/DL (ref 70–108)
GLUCOSE BLD-MCNC: 123 MG/DL (ref 70–108)
LV EF: 40 %
LVEF MODALITY: NORMAL
POTASSIUM SERPL-SCNC: 4.2 MEQ/L (ref 3.5–5.2)
SODIUM BLD-SCNC: 142 MEQ/L (ref 135–145)

## 2019-06-03 PROCEDURE — 36415 COLL VENOUS BLD VENIPUNCTURE: CPT

## 2019-06-03 PROCEDURE — 94640 AIRWAY INHALATION TREATMENT: CPT

## 2019-06-03 PROCEDURE — 6370000000 HC RX 637 (ALT 250 FOR IP): Performed by: INTERNAL MEDICINE

## 2019-06-03 PROCEDURE — 80048 BASIC METABOLIC PNL TOTAL CA: CPT

## 2019-06-03 PROCEDURE — 93306 TTE W/DOPPLER COMPLETE: CPT

## 2019-06-03 PROCEDURE — 2580000003 HC RX 258: Performed by: INTERNAL MEDICINE

## 2019-06-03 PROCEDURE — 99239 HOSP IP/OBS DSCHRG MGMT >30: CPT | Performed by: INTERNAL MEDICINE

## 2019-06-03 PROCEDURE — 82948 REAGENT STRIP/BLOOD GLUCOSE: CPT

## 2019-06-03 RX ORDER — AMOXICILLIN AND CLAVULANATE POTASSIUM 875; 125 MG/1; MG/1
1 TABLET, FILM COATED ORAL 2 TIMES DAILY
Qty: 8 TABLET | Refills: 0 | Status: SHIPPED | OUTPATIENT
Start: 2019-06-03 | End: 2019-06-07

## 2019-06-03 RX ORDER — FUROSEMIDE 20 MG/1
20 TABLET ORAL
Qty: 60 TABLET | Refills: 3 | Status: SHIPPED | OUTPATIENT
Start: 2019-06-03

## 2019-06-03 RX ORDER — HYDRALAZINE HYDROCHLORIDE 25 MG/1
25 TABLET, FILM COATED ORAL EVERY 6 HOURS PRN
Status: DISCONTINUED | OUTPATIENT
Start: 2019-06-03 | End: 2019-06-03 | Stop reason: HOSPADM

## 2019-06-03 RX ORDER — AZITHROMYCIN 500 MG/1
250 TABLET, FILM COATED ORAL DAILY
Qty: 1 TABLET | Refills: 0 | Status: SHIPPED | OUTPATIENT
Start: 2019-06-03 | End: 2019-06-05

## 2019-06-03 RX ORDER — AZITHROMYCIN 250 MG/1
500 TABLET, FILM COATED ORAL EVERY 24 HOURS
Status: DISCONTINUED | OUTPATIENT
Start: 2019-06-03 | End: 2019-06-03 | Stop reason: HOSPADM

## 2019-06-03 RX ADMIN — IPRATROPIUM BROMIDE AND ALBUTEROL SULFATE 1 AMPULE: .5; 3 SOLUTION RESPIRATORY (INHALATION) at 07:46

## 2019-06-03 RX ADMIN — FAMOTIDINE 20 MG: 20 TABLET ORAL at 08:37

## 2019-06-03 RX ADMIN — LISINOPRIL 40 MG: 40 TABLET ORAL at 08:37

## 2019-06-03 RX ADMIN — ATENOLOL 25 MG: 25 TABLET ORAL at 08:37

## 2019-06-03 RX ADMIN — Medication 10 ML: at 08:38

## 2019-06-03 RX ADMIN — ASPIRIN 325 MG: 325 TABLET, DELAYED RELEASE ORAL at 08:37

## 2019-06-03 RX ADMIN — IPRATROPIUM BROMIDE AND ALBUTEROL SULFATE 1 AMPULE: .5; 3 SOLUTION RESPIRATORY (INHALATION) at 11:56

## 2019-06-03 RX ADMIN — HYDRALAZINE HYDROCHLORIDE 25 MG: 25 TABLET, FILM COATED ORAL at 04:24

## 2019-06-03 RX ADMIN — AZITHROMYCIN 500 MG: 250 TABLET, FILM COATED ORAL at 13:11

## 2019-06-03 RX ADMIN — AMLODIPINE BESYLATE 10 MG: 10 TABLET ORAL at 08:37

## 2019-06-03 ASSESSMENT — PAIN SCALES - GENERAL
PAINLEVEL_OUTOF10: 0
PAINLEVEL_OUTOF10: 0

## 2019-06-03 NOTE — DISCHARGE SUMMARY
Hospital Medicine Discharge Summary      Patient Identification:   Apurva Reddy   :   MRN: [id number]   Account: [de-identified]      Patient's PCP: Issac Harkins MD    Admit Date: 2019     Discharge Date:   6/3/19    Admitting Physician: Annemarie Wang DO     Discharge Physician: Mao Dao MD     Discharge Diagnoses: Active Hospital Problems    Diagnosis Date Noted    Pneumonia of lower lobe due to infectious organism ([address] [J18.1] 2019    Hypoxia [R09.02] 2019    Pneumonia [J18.9] 2019       The patient was seen and examined on day of discharge and this discharge summary is in conjunction with any daily progress note from day of discharge. Hospital Course:   75 y. o. male who presented to [address] [place] [address] with \"evaluation of a cough which the patient reports has been present for the past 5 days. Due to continuation of his cough, the patient states to have gone to urgent care where lab work and a Chest XR was completed which revealed pneumonia. Patient was transferred to the ED for further evaluation and work up. He was treated with solumedrol, a duoneb breathing treatment, and an albuterol breathing treatment before arrival to the ED. Patient was transferred to the ED on 3L of O2 via NC. He states to be experiencing congestion in addition to his cough but denies any rhinorrhea, otalgia, pharyngitis, fever, chills, nausea, vomiting, chest pain, or shortness of breath. No medical history of COPD or asthma is reported. The patient reports no recent travel or exposure to anyone with similar symptoms. The patient reports that his son was diagnosed with legionella one year ago due to washing his farm equipment with well water. Patient reports that his farm equipment was recently watched with the same well water. Legionella and strep urine antigens were negative. Procal negative. However, He weaned from oxygen successfully with just abx.  He had some LE edema and JVD thus lasix was given and echo was obtained. Echo is penidng at this time, however, he felt much improved with diuresis and abx. Will discharge with three time weekly lasix and recommend a follow up chem in 1 week. He will complete po abx outpatient. Suspect that patient has some component of Acute Systolic CHF.              Exam:     Vitals:  Vitals:    06/03/19 0314 06/03/19 0315 06/03/19 0815 06/03/19 1129   BP: (!) 188/86 (!) 182/88 (!) 155/75 (!) 147/74   Pulse: 84  80 63   Resp: 18  18 18   Temp: 97.3 °F (36.3 °C)  97.6 °F (36.4 °C) 97.9 °F (36.6 °C)   TempSrc: Oral  Oral Oral   SpO2: 94%  96% 96%   Weight:       Height:         Weight: Weight: 249 lb 14.4 oz (113.4 kg)     24 hour intake/output:    Intake/Output Summary (Last 24 hours) at 6/3/2019 1347  Last data filed at 6/3/2019 1346  Gross per 24 hour   Intake 1277.99 ml   Output 2975 ml   Net -1697.01 ml         General appearance:  No apparent distress, appears stated age and cooperative. HEENT:  Normal cephalic, atraumatic without obvious deformity. Pupils equal, round, and reactive to light. Extra ocular muscles intact. Conjunctivae/corneas clear. Neck: Supple, with full range of motion. No jugular venous distention. Trachea midline. Respiratory:  Normal respiratory effort. Mild crackles at bases. Cardiovascular:  Regular rate and rhythm with normal S1/S2 without murmurs, rubs or gallops. Abdomen: Soft, non-tender, non-distended with normal bowel sounds. Musculoskeletal:  No clubbing, cyanosis or edema bilaterally. Full range of motion without deformity. Skin: Skin color, texture, turgor normal.  No rashes or lesions. Neurologic:  Neurovascularly intact without any focal sensory/motor deficits. Cranial nerves: II-XII intact, grossly non-focal.  Psychiatric:  Alert and oriented, thought content appropriate, normal insight  Capillary Refill: Brisk,< 3 seconds   Peripheral Pulses: +2 palpable, equal bilaterally       Labs:  For convenience and continuity at follow-up the following most recent labs are provided:      CBC:    Lab Results   Component Value Date    WBC 7.9 06/01/2019    HGB 15.4 06/01/2019    HCT 45.9 06/01/2019     06/01/2019       Renal:    Lab Results   Component Value Date     06/03/2019    K 4.2 06/03/2019    K 3.7 06/01/2019     06/03/2019    CO2 30 06/03/2019    BUN 26 06/03/2019    CREATININE 1.0 06/03/2019    CALCIUM 9.1 06/03/2019         Significant Diagnostic Studies    Radiology:   XR CHEST PORTABLE   Final Result   1. There is no acute cardiopulmonary process. **This report has been created using voice recognition software. It may contain minor errors which are inherent in voice recognition technology. **      Final report electronically signed by Dr. Maddy Luu on 6/2/2019 2:53 PM      XR CHEST STANDARD (2 VW)   Final Result      Left basilar atelectasis/infiltrate. **This report has been created using voice recognition software. It may contain minor errors which are inherent in voice recognition technology. **      Final report electronically signed by Dr. Onel Randall on 5/31/2019 1:53 PM             Consults:     None    Disposition: Home  Condition at Discharge: Stable    Code Status:  Full Code     Patient Instructions:    Discharge lab work: bmp  Activity: activity as tolerated  Diet: DIET CARB CONTROL;       Follow-up visits:   Brittany Britton [facility]  [address] [facility] [address]  Today  EMS    David Greenberg MD  [place] [address]  [phone]               Discharge Medications:      Louise Buenrostro   Home Medication Instructions BES:[id number]    Printed on:06/03/19 1347   Medication Information                      amLODIPine (NORVASC) 10 MG tablet  Take 10 mg by mouth daily             amoxicillin-clavulanate (AUGMENTIN) 875-125 MG per tablet  Take 1 tablet by mouth 2 times daily for 4 days             aspirin 325 MG EC tablet  Take 325 mg by mouth daily             atenolol (TENORMIN) 25 MG tablet  Take 25 mg by mouth daily             azithromycin (ZITHROMAX) 500 MG tablet  Take 0.5 tablets by mouth daily for 2 days             Chlorpheniramine-Acetaminophen (CORICIDIN HBP COLD/FLU PO)  Take by mouth             furosemide (LASIX) 20 MG tablet  Take 1 tablet by mouth three times a week             lisinopril (PRINIVIL;ZESTRIL) 40 MG tablet  Take 40 mg by mouth daily             pravastatin (PRAVACHOL) 40 MG tablet  Take 40 mg by mouth daily             terazosin (HYTRIN) 2 MG capsule  Take 2 mg by mouth nightly                 Time Spent on discharge is more than 30 minutes in the examination, evaluation, counseling and review of medications and discharge plan. Signed: Thank you Lian Swartz MD for the opportunity to be involved in this patient's care.     Electronically signed by Kevon Easton MD on 6/3/2019 at 1:47 PM

## 2019-06-03 NOTE — PROGRESS NOTES
Discharge teaching and instructions for diagnosis completed with patient using teachback method. AVS reviewed. Printed prescriptions given to patient. Patient voiced understanding regarding prescriptions, follow up appointments, and care of self at home. Discharged ambulatory and in a wheelchair to  home with support per family.

## 2019-06-03 NOTE — CARE COORDINATION
6/3/19, 9:57 AM      Chhaya Hull       Admitted from: ER 5/31/2019/ Laura Arriaga day: 3   Location: [facility] Reason for admit: Pneumonia [J18.9] Status: IP  Admit order signed?: yes  PMH:  has a past medical history of Arthritis, CAD (coronary artery disease), Chronic kidney disease, Hyperlipidemia, Hypertension, Pneumonia, and Stroke ([facility] [address] Medications:  Scheduled Meds:   insulin lispro  0-12 Units Subcutaneous TID WC    insulin lispro  0-6 Units Subcutaneous Nightly    amLODIPine  10 mg Oral Daily    aspirin  325 mg Oral Daily    atenolol  25 mg Oral Daily    lisinopril  40 mg Oral Daily    pravastatin  40 mg Oral Daily    terazosin  2 mg Oral Nightly    sodium chloride flush  10 mL Intravenous 2 times per day    enoxaparin  40 mg Subcutaneous Q24H    famotidine  20 mg Oral BID    ipratropium-albuterol  1 ampule Inhalation Q4H WA    cefTRIAXone (ROCEPHIN) IV  1 g Intravenous Q24H    And    azithromycin  500 mg Intravenous Q24H     Continuous Infusions:   dextrose        Pertinent Info/Orders/Treatment Plan:  IV ATBs, duonebs, DM management. Echo pending. Possible discharge today. Diet: DIET CARB CONTROL;   Smoking status:  reports that he has never smoked. He has never used smokeless tobacco.   PCP: Pebbles Rawls MD  Readmission: no  Readmission Risk Score: 9%    Discharge Planning  Current Residence:  Private Residence  Living Arrangements:  Spouse/Significant Other   Support Systems:  Family Members, Spouse/Significant Other  Current Services PTA:     Potential Assistance Needed:  N/A  Potential Assistance Purchasing Medications:  No  Does patient want to participate in local refill/ meds to beds program?  Yes  Type of Home Care Services:  None  Patient expects to be discharged to:  home  Expected Discharge date:  06/03/19  Follow Up Appointment: Best Day/ Time: Monday PM    Discharge Plan: Spoke with Parris Young, he plans return home with his wife at discharge.   He denies need for [facility] services, but states he plans to follow up at the [facility] within a week.   Denies need for DME or med assist.       Evaluation: no

## 2019-06-03 NOTE — PLAN OF CARE
Problem: Impaired respiratory status  Goal: Clear lung sounds  Outcome: Met This Shift   Breathsounds clear at this time

## 2019-06-04 NOTE — CARE COORDINATION
6/4/19, 2:34 PM    Home with wife. Declines HH. Denies needs. Discharge plan discussed by  and . Discharge plan reviewed with patient/ family. Patient/ family verbalize understanding of discharge plan and are in agreement with plan. Understanding was demonstrated using the teach back method.        IMM Letter  IMM Letter given to Patient/Family/Significant other/Guardian/POA/by[de-identified] Breanna Yi CM   IMM Letter date given[de-identified] 06/03/19  IMM Letter time given[de-identified] 6415

## 2019-06-05 ENCOUNTER — TELEPHONE (OUTPATIENT)
Dept: CARDIOLOGY CLINIC | Age: 74
End: 2019-06-05

## 2019-06-05 NOTE — TELEPHONE ENCOUNTER
Pt wife called in stating she would like echo results. Pt was seen in the hospital, and no appt was made to f/u. And has not been seen in our office.      Ejection fraction is visually estimated at 40%.   There was moderate global hypokinesis of the left ventricle.   The aortic valve leaflets were not well visualized.   Aortic valve appears tricuspid.   Aortic valve leaflets are somewhat thickened.      Signature

## 2019-06-06 LAB
BLOOD CULTURE, ROUTINE: NORMAL
BLOOD CULTURE, ROUTINE: NORMAL

## 2019-08-05 ENCOUNTER — APPOINTMENT (OUTPATIENT)
Dept: GENERAL RADIOLOGY | Age: 74
DRG: 303 | End: 2019-08-05
Payer: MEDICARE

## 2019-08-05 ENCOUNTER — HOSPITAL ENCOUNTER (INPATIENT)
Age: 74
LOS: 3 days | Discharge: HOME | DRG: 303 | End: 2019-08-08
Attending: FAMILY MEDICINE | Admitting: INTERNAL MEDICINE
Payer: MEDICARE

## 2019-08-05 DIAGNOSIS — R07.89 OTHER CHEST PAIN: Primary | ICD-10-CM

## 2019-08-05 DIAGNOSIS — I10 ESSENTIAL HYPERTENSION: ICD-10-CM

## 2019-08-05 DIAGNOSIS — N18.9 CHRONIC KIDNEY DISEASE, UNSPECIFIED CKD STAGE: ICD-10-CM

## 2019-08-05 DIAGNOSIS — E78.5 HYPERLIPIDEMIA, UNSPECIFIED HYPERLIPIDEMIA TYPE: ICD-10-CM

## 2019-08-05 PROBLEM — R73.9 HYPERGLYCEMIA: Status: ACTIVE | Noted: 2019-08-05

## 2019-08-05 PROBLEM — I25.9 CHEST PAIN DUE TO MYOCARDIAL ISCHEMIA: Status: ACTIVE | Noted: 2019-08-05

## 2019-08-05 PROBLEM — R07.9 CHEST PAIN: Status: ACTIVE | Noted: 2019-08-05

## 2019-08-05 LAB
ANION GAP SERPL CALCULATED.3IONS-SCNC: 11 MEQ/L (ref 8–16)
BASOPHILS # BLD: 0.7 %
BASOPHILS ABSOLUTE: 0.1 THOU/MM3 (ref 0–0.1)
BUN BLDV-MCNC: 19 MG/DL (ref 7–22)
CALCIUM SERPL-MCNC: 9.4 MG/DL (ref 8.5–10.5)
CHLORIDE BLD-SCNC: 105 MEQ/L (ref 98–111)
CO2: 24 MEQ/L (ref 23–33)
CREAT SERPL-MCNC: 0.8 MG/DL (ref 0.4–1.2)
EOSINOPHIL # BLD: 4.4 %
EOSINOPHILS ABSOLUTE: 0.4 THOU/MM3 (ref 0–0.4)
ERYTHROCYTE [DISTWIDTH] IN BLOOD BY AUTOMATED COUNT: 13.5 % (ref 11.5–14.5)
ERYTHROCYTE [DISTWIDTH] IN BLOOD BY AUTOMATED COUNT: 46.8 FL (ref 35–45)
GFR SERPL CREATININE-BSD FRML MDRD: > 90 ML/MIN/1.73M2
GLUCOSE BLD-MCNC: 146 MG/DL (ref 70–108)
HCT VFR BLD CALC: 43.8 % (ref 42–52)
HEMOGLOBIN: 14.3 GM/DL (ref 14–18)
IMMATURE GRANS (ABS): 0.03 THOU/MM3 (ref 0–0.07)
IMMATURE GRANULOCYTES: 0.4 %
LYMPHOCYTES # BLD: 23 %
LYMPHOCYTES ABSOLUTE: 2 THOU/MM3 (ref 1–4.8)
MCH RBC QN AUTO: 30.9 PG (ref 26–33)
MCHC RBC AUTO-ENTMCNC: 32.6 GM/DL (ref 32.2–35.5)
MCV RBC AUTO: 94.6 FL (ref 80–94)
MONOCYTES # BLD: 9 %
MONOCYTES ABSOLUTE: 0.8 THOU/MM3 (ref 0.4–1.3)
NUCLEATED RED BLOOD CELLS: 0 /100 WBC
OSMOLALITY CALCULATION: 284.3 MOSMOL/KG (ref 275–300)
PLATELET # BLD: 209 THOU/MM3 (ref 130–400)
PMV BLD AUTO: 10.5 FL (ref 9.4–12.4)
POTASSIUM SERPL-SCNC: 3.6 MEQ/L (ref 3.5–5.2)
PRO-BNP: 315.3 PG/ML (ref 0–1800)
RBC # BLD: 4.63 MILL/MM3 (ref 4.7–6.1)
SEG NEUTROPHILS: 62.5 %
SEGMENTED NEUTROPHILS ABSOLUTE COUNT: 5.3 THOU/MM3 (ref 1.8–7.7)
SODIUM BLD-SCNC: 140 MEQ/L (ref 135–145)
TROPONIN T: < 0.01 NG/ML
WBC # BLD: 8.5 THOU/MM3 (ref 4.8–10.8)

## 2019-08-05 PROCEDURE — 80048 BASIC METABOLIC PNL TOTAL CA: CPT

## 2019-08-05 PROCEDURE — 6360000002 HC RX W HCPCS: Performed by: INTERNAL MEDICINE

## 2019-08-05 PROCEDURE — 96361 HYDRATE IV INFUSION ADD-ON: CPT

## 2019-08-05 PROCEDURE — 36415 COLL VENOUS BLD VENIPUNCTURE: CPT

## 2019-08-05 PROCEDURE — 6370000000 HC RX 637 (ALT 250 FOR IP): Performed by: INTERNAL MEDICINE

## 2019-08-05 PROCEDURE — 93005 ELECTROCARDIOGRAM TRACING: CPT | Performed by: FAMILY MEDICINE

## 2019-08-05 PROCEDURE — 6370000000 HC RX 637 (ALT 250 FOR IP): Performed by: PHYSICIAN ASSISTANT

## 2019-08-05 PROCEDURE — 84484 ASSAY OF TROPONIN QUANT: CPT

## 2019-08-05 PROCEDURE — 96372 THER/PROPH/DIAG INJ SC/IM: CPT

## 2019-08-05 PROCEDURE — 2580000003 HC RX 258: Performed by: INTERNAL MEDICINE

## 2019-08-05 PROCEDURE — 99223 1ST HOSP IP/OBS HIGH 75: CPT | Performed by: INTERNAL MEDICINE

## 2019-08-05 PROCEDURE — 85025 COMPLETE CBC W/AUTO DIFF WBC: CPT

## 2019-08-05 PROCEDURE — 71045 X-RAY EXAM CHEST 1 VIEW: CPT

## 2019-08-05 PROCEDURE — 2709999900 HC NON-CHARGEABLE SUPPLY

## 2019-08-05 PROCEDURE — 99285 EMERGENCY DEPT VISIT HI MDM: CPT

## 2019-08-05 PROCEDURE — 96360 HYDRATION IV INFUSION INIT: CPT

## 2019-08-05 PROCEDURE — 2060000000 HC ICU INTERMEDIATE R&B

## 2019-08-05 PROCEDURE — G0378 HOSPITAL OBSERVATION PER HR: HCPCS

## 2019-08-05 PROCEDURE — 83880 ASSAY OF NATRIURETIC PEPTIDE: CPT

## 2019-08-05 RX ORDER — FUROSEMIDE 20 MG/1
20 TABLET ORAL
Status: DISCONTINUED | OUTPATIENT
Start: 2019-08-05 | End: 2019-08-08 | Stop reason: HOSPADM

## 2019-08-05 RX ORDER — PRAVASTATIN SODIUM 40 MG
40 TABLET ORAL DAILY
Status: DISCONTINUED | OUTPATIENT
Start: 2019-08-05 | End: 2019-08-05

## 2019-08-05 RX ORDER — SODIUM CHLORIDE 9 MG/ML
INJECTION, SOLUTION INTRAVENOUS CONTINUOUS
Status: DISCONTINUED | OUTPATIENT
Start: 2019-08-05 | End: 2019-08-06

## 2019-08-05 RX ORDER — SODIUM CHLORIDE 0.9 % (FLUSH) 0.9 %
10 SYRINGE (ML) INJECTION PRN
Status: DISCONTINUED | OUTPATIENT
Start: 2019-08-05 | End: 2019-08-08 | Stop reason: HOSPADM

## 2019-08-05 RX ORDER — CARVEDILOL 12.5 MG/1
6.25 TABLET ORAL DAILY
COMMUNITY
End: 2022-09-22 | Stop reason: SDUPTHER

## 2019-08-05 RX ORDER — PRAVASTATIN SODIUM 20 MG
20 TABLET ORAL DAILY
Status: DISCONTINUED | OUTPATIENT
Start: 2019-08-06 | End: 2019-08-08 | Stop reason: HOSPADM

## 2019-08-05 RX ORDER — TERAZOSIN 1 MG/1
2 CAPSULE ORAL NIGHTLY
Status: DISCONTINUED | OUTPATIENT
Start: 2019-08-05 | End: 2019-08-05 | Stop reason: SDUPTHER

## 2019-08-05 RX ORDER — TERAZOSIN 1 MG/1
2 CAPSULE ORAL NIGHTLY
Status: DISCONTINUED | OUTPATIENT
Start: 2019-08-05 | End: 2019-08-05 | Stop reason: CLARIF

## 2019-08-05 RX ORDER — CARVEDILOL 6.25 MG/1
12.5 TABLET ORAL DAILY
Status: DISCONTINUED | OUTPATIENT
Start: 2019-08-05 | End: 2019-08-05 | Stop reason: SDUPTHER

## 2019-08-05 RX ORDER — TERAZOSIN 2 MG/1
2 CAPSULE ORAL NIGHTLY
Status: DISCONTINUED | OUTPATIENT
Start: 2019-08-05 | End: 2019-08-08 | Stop reason: HOSPADM

## 2019-08-05 RX ORDER — DOXAZOSIN MESYLATE 4 MG/1
2 TABLET ORAL NIGHTLY
Status: DISCONTINUED | OUTPATIENT
Start: 2019-08-05 | End: 2019-08-05

## 2019-08-05 RX ORDER — POTASSIUM CHLORIDE 7.45 MG/ML
10 INJECTION INTRAVENOUS PRN
Status: DISCONTINUED | OUTPATIENT
Start: 2019-08-05 | End: 2019-08-08 | Stop reason: HOSPADM

## 2019-08-05 RX ORDER — POTASSIUM CHLORIDE 20 MEQ/1
40 TABLET, EXTENDED RELEASE ORAL PRN
Status: DISCONTINUED | OUTPATIENT
Start: 2019-08-05 | End: 2019-08-08 | Stop reason: HOSPADM

## 2019-08-05 RX ORDER — AMLODIPINE BESYLATE 10 MG/1
10 TABLET ORAL DAILY
Status: DISCONTINUED | OUTPATIENT
Start: 2019-08-05 | End: 2019-08-08 | Stop reason: HOSPADM

## 2019-08-05 RX ORDER — ISOSORBIDE DINITRATE 10 MG/1
10 TABLET ORAL 2 TIMES DAILY
Status: ON HOLD | COMMUNITY
End: 2019-08-05 | Stop reason: ALTCHOICE

## 2019-08-05 RX ORDER — CARVEDILOL 12.5 MG/1
12.5 TABLET ORAL DAILY
Status: DISCONTINUED | OUTPATIENT
Start: 2019-08-06 | End: 2019-08-08 | Stop reason: HOSPADM

## 2019-08-05 RX ORDER — ISOSORBIDE DINITRATE 10 MG/1
10 TABLET ORAL 3 TIMES DAILY
Status: DISCONTINUED | OUTPATIENT
Start: 2019-08-05 | End: 2019-08-06

## 2019-08-05 RX ORDER — SODIUM CHLORIDE 0.9 % (FLUSH) 0.9 %
10 SYRINGE (ML) INJECTION EVERY 12 HOURS SCHEDULED
Status: DISCONTINUED | OUTPATIENT
Start: 2019-08-05 | End: 2019-08-08 | Stop reason: HOSPADM

## 2019-08-05 RX ORDER — ONDANSETRON 2 MG/ML
4 INJECTION INTRAMUSCULAR; INTRAVENOUS EVERY 6 HOURS PRN
Status: DISCONTINUED | OUTPATIENT
Start: 2019-08-05 | End: 2019-08-08 | Stop reason: HOSPADM

## 2019-08-05 RX ORDER — ISOSORBIDE MONONITRATE 30 MG/1
30 TABLET, EXTENDED RELEASE ORAL DAILY
Status: ON HOLD | COMMUNITY
End: 2019-08-08 | Stop reason: HOSPADM

## 2019-08-05 RX ORDER — FAMOTIDINE 20 MG/1
20 TABLET, FILM COATED ORAL 2 TIMES DAILY
Status: DISCONTINUED | OUTPATIENT
Start: 2019-08-05 | End: 2019-08-08 | Stop reason: HOSPADM

## 2019-08-05 RX ADMIN — ENOXAPARIN SODIUM 40 MG: 40 INJECTION SUBCUTANEOUS at 17:48

## 2019-08-05 RX ADMIN — ISOSORBIDE DINITRATE 10 MG: 10 TABLET ORAL at 21:42

## 2019-08-05 RX ADMIN — PRAVASTATIN SODIUM 20 MG: 20 TABLET ORAL at 23:39

## 2019-08-05 RX ADMIN — FAMOTIDINE 20 MG: 20 TABLET ORAL at 21:42

## 2019-08-05 RX ADMIN — SODIUM CHLORIDE: 9 INJECTION, SOLUTION INTRAVENOUS at 17:38

## 2019-08-05 RX ADMIN — TERAZOSIN 2 MG: 2 CAPSULE ORAL at 21:43

## 2019-08-05 ASSESSMENT — ENCOUNTER SYMPTOMS
SORE THROAT: 0
WHEEZING: 0
VOMITING: 0
EYE REDNESS: 0
RHINORRHEA: 0
EYE DISCHARGE: 0
BACK PAIN: 0
DIARRHEA: 0
SHORTNESS OF BREATH: 0
ABDOMINAL PAIN: 0
COUGH: 0
NAUSEA: 0

## 2019-08-05 ASSESSMENT — PAIN SCALES - GENERAL
PAINLEVEL_OUTOF10: 0

## 2019-08-05 ASSESSMENT — HEART SCORE: ECG: 1

## 2019-08-05 NOTE — ED TRIAGE NOTES
Patient presents with EKG changes from [place] [facility]. Patient states he had a cath done 07/22/19 through the groin. States this morning he had right armpit pain and got light headed. States the episode did not last long and he attempted to go to the [place].  States they sent him over here for further evaluation

## 2019-08-05 NOTE — ED PROVIDER NOTES
evaluated. The patient was seen and evaluated in the ED today following EKG changes. Patient was referred from the [place] to be further evaluatd. Within the department I observed the patient's vital signs to show hypertension. Exam was unremarkable. Radiologic studies within the department revealed no acute intrathoracic process. Laboratory results showed abnormal RBC, MCV, RDW-SD. Patient has a HEART score of 6. His EKG shows ST depressions. Patient has a 99% blockage with active chest pain. Because of those reasons, patient will be admitted to Cardiology. I consulted Dr. Josafat Buckner who graciously agreed to admit the patient and advised. I explained my proposed course of treatment to patient and family, who was amenable to my decision, and I answered all questions that were asked. The patient was then admitted under Dr. Josafat Buckner. CRITICAL CARE:   None     CONSULTS:  None    PROCEDURES:  None     FINAL IMPRESSION     1. Other chest pain    2. Essential hypertension    3. Chronic kidney disease, unspecified CKD stage    4. Hyperlipidemia, unspecified hyperlipidemia type          DISPOSITION/PLAN   Admit    PATIENT REFERRED TO:  Lina Langston MD  [place] [id number] [phone]            Scribe:  Jabier Sifuentes 8/5/19 10:27 AM Scribing for and in the presence of Chichi Burton MD.    Signed by: Kodak Low, 08/06/19 11:28 AM    Provider:  I personally performed the services described in the documentation, reviewed and edited the documentation which was dictated to the scribe in my presence, and it accurately records my words and actions.     Chichi Burton MD 8/5/19 11:28 AM         Chichi Burton MD  08/06/19 [id number]

## 2019-08-05 NOTE — LETTER
Beneficiary Notification Letter     This [place] Provider is Participating in an Innovative Payment and [address] [place] [address] [place] from Tita Lowe:   Malik is participating in a Medicare initiative called the [facility] for [facility]. You are receiving this letter because your health care provider has identified you as a patient who is receiving care through this initiative. Health care providers participating in the [facility], including Malik, will work with Medicare to improve care for patients. Your Medicare rights have not been changed. You still have all the same Medicare rights and protections, including the right to choose which hospital, doctor, or other health care provider you see. However, because Malik chose to participate in the [address] [place] [address], all Medicare beneficiaries who meet the eligibility criteria of this initiative will receive care under the initiative. If you do not wish to receive care under the Bundled [employer]ments [facility], you must choose a health care provider that does not participate in this initiative for your care. Regardless of which health care provider you see, Medicare will continue to cover all of your medically necessary services. Bundled Payments for Care Improvement Advanced aims to help improve your care     The Bundled Payments [facility] [address] [facility] is an innovative Medicare initiative that encourages your doctors, hospitals, and other health care providers to work more closely together so you get better care during and following certain hospital stays.  In this initiative, doctors and hospitals may work closely with certain health care providers and 1-800- MEDICARE (9-521.618.4230). TTY users should call 4-138.659.2181. · To find a different doctor, visit Medicare's Physician Compare website, HDTapes.co.nz, or call 1-800-MEDICARE (665 5565). TTY users should call 1-295.102.7736. · To find a different skilled nursing facility, visit [employer] website, https://www.Birchbox/, or call 1-800-MEDICARE (1- 969.884.3325). TTY users should call 9-953.554.6082. · To find a different long term care hospital, visit [facility] [address] Compare website, [employer] INTERACTIONlogWeWork.ReelBig, or call 1-800- MEDICARE (522 4057). TTY users should call 9-224.618.9092. · To find a different inpatient rehabilitation facility, visit [facility] [address] Compare website, www.medicare.gov/ inpatientrehabilitation facilitycompare, or call 1-800-MEDICARE (3-627.758.6127). TTY users should call 3- 771.393.9865. · To find a different home health agency, visit [address] website, www.medicare.gov/homehealthcompare, or call 1-800-MEDICARE (9-527- 681-2648). TTY users should call 4-372.672.7799.

## 2019-08-05 NOTE — PROGRESS NOTES
Pt admitted to  [facility] in a wheelchair. Complaints: Chest pain / discomfort. IV site free of s/s of infection or infiltration. Vital signs obtained. Assessment and data collection initiated. Oriented to room. Policies and procedures for 4a explained All questions answered with no further questions at this time. Fall prevention and safety brochure discussed with patient.    Ayanna Mcgrath 8/5/2019 2:39 PM

## 2019-08-05 NOTE — PLAN OF CARE
Problem: Falls - Risk of:  Goal: Will remain free from falls  Description  Will remain free from falls  Outcome: Ongoing  Goal: Absence of physical injury  Description  Absence of physical injury  Outcome: Ongoing     Problem: Safety:  Goal: Free from accidental physical injury  Description  Free from accidental physical injury  Outcome: Ongoing  Goal: Free from intentional harm  Description  Free from intentional harm  Outcome: Ongoing     Problem: Daily Care:  Goal: Daily care needs are met  Description  Daily care needs are met  Outcome: Ongoing     Problem: Pain:  Goal: Patient's pain/discomfort is manageable  Description  Patient's pain/discomfort is manageable  Outcome: Ongoing     Problem: Discharge Planning:  Goal: Patients continuum of care needs are met  Description  Patients continuum of care needs are met  Outcome: Ongoing   Care plan reviewed with patient. Patient verbalizes understanding of the plan of care and contributed to goal setting.

## 2019-08-05 NOTE — H&P
History & Physical        Patient:  Kaylene Zaman  YOB: 1945    MRN: [id number]     Acct: [de-identified]    PCP: Sarah Garcia MD    Date of Admission: 8/5/2019    Date of Service: Pt seen/examined on 8/5/2019 and Admitted to Inpatient with expected LOS greater than two midnights due to medical therapy. Chief Complaint:  No chief complaint on file. History Of Present Illness:     75 y.o. male who presented to [facility] with \"evaluation of right arm pit pain. He presents as a referral from the [facility] for further evaluation and treatment here. Today, he felt light headed earlier, but states he is currently asymptomatic. Patient had a cath done 2 weeks ago and states he was referred to [facility] to determine treatment of vessel that was discovered to be 99% blocked. Patient had open heart surgery 10 years ago. \"-per er note and confirmed by myself    Addendum: daughter states the 99% lesion was highly calcified and referred to [facility]      Past Medical History:          Diagnosis Date    Arthritis     CAD (coronary artery disease)     Chronic kidney disease     Chronic kidney disease     Hyperlipidemia     Hypertension     Pneumonia     Stroke ([facility] [address]     2008       Past Surgical History:          Procedure Laterality Date    CARDIAC SURGERY      COLONOSCOPY      VEIN BYPASS SURGERY         Medications Prior to Admission:      Prior to Admission medications    Medication Sig Start Date End Date Taking?  Authorizing Provider   carvedilol (COREG) 12.5 MG tablet Take 12.5 mg by mouth daily   Yes Historical Provider, MD   isosorbide dinitrate (ISORDIL) 10 MG tablet Take 10 mg by mouth 2 times daily   Yes Historical Provider, MD   furosemide (LASIX) 20 MG tablet Take 1 tablet by mouth three times a week 6/3/19   Halina Giles MD   Chlorpheniramine-Acetaminophen (CORICIDIN HBP COLD/FLU PO) Take by mouth    Historical Provider, MD   terazosin (HYTRIN) 2

## 2019-08-06 LAB
ANION GAP SERPL CALCULATED.3IONS-SCNC: 15 MEQ/L (ref 8–16)
AVERAGE GLUCOSE: 126 MG/DL (ref 70–126)
BUN BLDV-MCNC: 17 MG/DL (ref 7–22)
CALCIUM SERPL-MCNC: 9 MG/DL (ref 8.5–10.5)
CHLORIDE BLD-SCNC: 106 MEQ/L (ref 98–111)
CHOLESTEROL, TOTAL: 139 MG/DL (ref 100–199)
CO2: 22 MEQ/L (ref 23–33)
CREAT SERPL-MCNC: 0.8 MG/DL (ref 0.4–1.2)
EKG ATRIAL RATE: 61 BPM
EKG ATRIAL RATE: 62 BPM
EKG P AXIS: 55 DEGREES
EKG P AXIS: 72 DEGREES
EKG P-R INTERVAL: 150 MS
EKG P-R INTERVAL: 170 MS
EKG Q-T INTERVAL: 466 MS
EKG Q-T INTERVAL: 488 MS
EKG QRS DURATION: 102 MS
EKG QRS DURATION: 96 MS
EKG QTC CALCULATION (BAZETT): 472 MS
EKG QTC CALCULATION (BAZETT): 491 MS
EKG R AXIS: 50 DEGREES
EKG R AXIS: 71 DEGREES
EKG T AXIS: -151 DEGREES
EKG T AXIS: 144 DEGREES
EKG VENTRICULAR RATE: 61 BPM
EKG VENTRICULAR RATE: 62 BPM
ERYTHROCYTE [DISTWIDTH] IN BLOOD BY AUTOMATED COUNT: 13.6 % (ref 11.5–14.5)
ERYTHROCYTE [DISTWIDTH] IN BLOOD BY AUTOMATED COUNT: 47.8 FL (ref 35–45)
GFR SERPL CREATININE-BSD FRML MDRD: > 90 ML/MIN/1.73M2
GLUCOSE BLD-MCNC: 110 MG/DL (ref 70–108)
HBA1C MFR BLD: 6.2 % (ref 4.4–6.4)
HCT VFR BLD CALC: 43.3 % (ref 42–52)
HDLC SERPL-MCNC: 40 MG/DL
HEMOGLOBIN: 14.1 GM/DL (ref 14–18)
LDL CHOLESTEROL CALCULATED: 54 MG/DL
MAGNESIUM: 1.9 MG/DL (ref 1.6–2.4)
MCH RBC QN AUTO: 31.1 PG (ref 26–33)
MCHC RBC AUTO-ENTMCNC: 32.6 GM/DL (ref 32.2–35.5)
MCV RBC AUTO: 95.4 FL (ref 80–94)
PLATELET # BLD: 207 THOU/MM3 (ref 130–400)
PMV BLD AUTO: 9.9 FL (ref 9.4–12.4)
POTASSIUM REFLEX MAGNESIUM: 3.9 MEQ/L (ref 3.5–5.2)
RBC # BLD: 4.54 MILL/MM3 (ref 4.7–6.1)
SODIUM BLD-SCNC: 143 MEQ/L (ref 135–145)
TRIGL SERPL-MCNC: 227 MG/DL (ref 0–199)
WBC # BLD: 9.2 THOU/MM3 (ref 4.8–10.8)

## 2019-08-06 PROCEDURE — 6370000000 HC RX 637 (ALT 250 FOR IP): Performed by: PHYSICIAN ASSISTANT

## 2019-08-06 PROCEDURE — 2709999900 HC NON-CHARGEABLE SUPPLY

## 2019-08-06 PROCEDURE — 83735 ASSAY OF MAGNESIUM: CPT

## 2019-08-06 PROCEDURE — 2580000003 HC RX 258: Performed by: INTERNAL MEDICINE

## 2019-08-06 PROCEDURE — 83036 HEMOGLOBIN GLYCOSYLATED A1C: CPT

## 2019-08-06 PROCEDURE — 96361 HYDRATE IV INFUSION ADD-ON: CPT

## 2019-08-06 PROCEDURE — 93010 ELECTROCARDIOGRAM REPORT: CPT | Performed by: INTERNAL MEDICINE

## 2019-08-06 PROCEDURE — 6370000000 HC RX 637 (ALT 250 FOR IP): Performed by: INTERNAL MEDICINE

## 2019-08-06 PROCEDURE — 36415 COLL VENOUS BLD VENIPUNCTURE: CPT

## 2019-08-06 PROCEDURE — 99226 PR SBSQ OBSERVATION CARE/DAY 35 MINUTES: CPT | Performed by: INTERNAL MEDICINE

## 2019-08-06 PROCEDURE — G0378 HOSPITAL OBSERVATION PER HR: HCPCS

## 2019-08-06 PROCEDURE — 2060000000 HC ICU INTERMEDIATE R&B

## 2019-08-06 PROCEDURE — 80061 LIPID PANEL: CPT

## 2019-08-06 PROCEDURE — 80048 BASIC METABOLIC PNL TOTAL CA: CPT

## 2019-08-06 PROCEDURE — 99214 OFFICE O/P EST MOD 30 MIN: CPT | Performed by: PHYSICIAN ASSISTANT

## 2019-08-06 PROCEDURE — 96372 THER/PROPH/DIAG INJ SC/IM: CPT

## 2019-08-06 PROCEDURE — 6360000002 HC RX W HCPCS: Performed by: INTERNAL MEDICINE

## 2019-08-06 PROCEDURE — 85027 COMPLETE CBC AUTOMATED: CPT

## 2019-08-06 PROCEDURE — 93005 ELECTROCARDIOGRAM TRACING: CPT | Performed by: INTERNAL MEDICINE

## 2019-08-06 RX ORDER — LISINOPRIL 40 MG/1
40 TABLET ORAL DAILY
Status: DISCONTINUED | OUTPATIENT
Start: 2019-08-06 | End: 2019-08-08 | Stop reason: HOSPADM

## 2019-08-06 RX ORDER — ISOSORBIDE MONONITRATE 30 MG/1
30 TABLET, EXTENDED RELEASE ORAL DAILY
Status: DISCONTINUED | OUTPATIENT
Start: 2019-08-06 | End: 2019-08-08

## 2019-08-06 RX ADMIN — TERAZOSIN 2 MG: 2 CAPSULE ORAL at 20:47

## 2019-08-06 RX ADMIN — ASPIRIN 325 MG: 325 TABLET, DELAYED RELEASE ORAL at 09:12

## 2019-08-06 RX ADMIN — FAMOTIDINE 20 MG: 20 TABLET ORAL at 20:46

## 2019-08-06 RX ADMIN — AMLODIPINE BESYLATE 10 MG: 10 TABLET ORAL at 09:08

## 2019-08-06 RX ADMIN — ENOXAPARIN SODIUM 40 MG: 40 INJECTION SUBCUTANEOUS at 15:48

## 2019-08-06 RX ADMIN — Medication 10 ML: at 20:46

## 2019-08-06 RX ADMIN — LISINOPRIL 40 MG: 40 TABLET ORAL at 09:15

## 2019-08-06 RX ADMIN — PRAVASTATIN SODIUM 20 MG: 20 TABLET ORAL at 20:47

## 2019-08-06 RX ADMIN — FAMOTIDINE 20 MG: 20 TABLET ORAL at 09:19

## 2019-08-06 RX ADMIN — ISOSORBIDE MONONITRATE 30 MG: 30 TABLET, EXTENDED RELEASE ORAL at 09:17

## 2019-08-06 RX ADMIN — CARVEDILOL 12.5 MG: 12.5 TABLET ORAL at 09:13

## 2019-08-06 ASSESSMENT — PAIN SCALES - GENERAL
PAINLEVEL_OUTOF10: 0

## 2019-08-06 NOTE — PLAN OF CARE
Problem: Falls - Risk of:  Goal: Will remain free from falls  Description  Will remain free from falls  8/6/2019 1338 by Lenore Alicia RN  Outcome: Ongoing  Note:   Patient free of falls this shift     Problem: Falls - Risk of:  Goal: Absence of physical injury  Description  Absence of physical injury  8/6/2019 1338 by Lneore Alicia RN  Outcome: Ongoing  Note:   Patient without physical injury     Problem: Daily Care:  Goal: Daily care needs are met  Description  Daily care needs are met  8/6/2019 1338 by Lenore Alicia RN  Outcome: Ongoing  Note:   Ongoing needs are being met     Problem: Pain:  Goal: Patient's pain/discomfort is manageable  Description  Patient's pain/discomfort is manageable  8/6/2019 1338 by Lenore Alicia RN  Outcome: Ongoing  Note:   Patient denies pain this shift     Problem: Discharge Planning:  Goal: Patients continuum of care needs are met  Description  Patients continuum of care needs are met  8/6/2019 1338 by Lenore Alicia RN  Outcome: Ongoing  Note:   Discharge planning continues     Care plan reviewed with patient and spouse. Patient and spouse verbalize understanding of the plan of care and contribute to goal setting.

## 2019-08-06 NOTE — PROGRESS NOTES
Assessment and Plan:        1. Chest pain/CAD: History of CABG, recent LHC with reported 99% lesion of circumflex performed at [facility]. Patient states that the plaque was severely calcific and couldn't have stent placed and was referred to [facility]. While at home developed axillary chest pain and has been having decreased functional capacity over past several weeks. Trop negative x3   - Cath films via  to be delivered today. - cardiology consulted, awaiting cath films to plan     - On ASA, statin, BB  2. Chronic HFrEF: not acutely exacerbated. Continue GDMT and lasix. 3.   HTN: Continue home meds  4. Hyperglycemia: monitor blood sugars. CC:  Chest pain  HPI: 75 y.o. male who presented to [facility] with \"evaluation of right arm pit pain. He presents as a referral from the [facility] for further evaluation and treatment here. Today, he felt light headed earlier, but states he is currently asymptomatic. Patient had a cath done 2 weeks ago and states he was referred to [facility] clinic to determine treatment of vessel that was discovered to be 99% blocked. Patient had open heart surgery 10 years ago. ROS (12 point review of systems completed. Pertinent positives noted. Otherwise ROS is negative) : No chest pain today, Endorses decreased functional capacity lately. No diaphoresis.  No SOB at rest  PMH:  Per HPI  Medications:       isosorbide mononitrate  30 mg Oral Daily    lisinopril  40 mg Oral Daily    aspirin  325 mg Oral Daily    furosemide  20 mg Oral Once per day on Mon Wed Fri    amLODIPine  10 mg Oral Daily    sodium chloride flush  10 mL Intravenous 2 times per day    famotidine  20 mg Oral BID    enoxaparin  40 mg Subcutaneous Q24H    carvedilol  12.5 mg Oral Daily    terazosin  2 mg Oral Nightly    pravastatin  20 mg Oral Daily       Vital Signs:   /73   Pulse 52   Temp 97.4 °F (36.3 °C) (Oral)   Resp 18   Ht 5' 9\" (1.753 m)   Wt 248 lb 8 oz

## 2019-08-06 NOTE — CONSULTS
Consult to Cardiology  Consult performed by: Daniela Rodriguez MD  Consult ordered by: Aleyda Witt DO          Chest pain    Cad/ s/p CABG    Cath July 22, 2019      Plan    Get hard copy and will be reviewed by interventional  OMT    [id number]    Daniela Rodriguez MD
No bruits. S1, S2 well heard. No murmur or gallop rhythm. ABDOMEN:  Soft, nontender, and nondistended. Bowel sounds are positive. GENITALIA:  No CVA, flank or suprapubic tenderness. LOCOMOTOR:  No cyanosis, clubbing, or muscle or joint tenderness. He  has bilateral pedal and pretibial edema of +1. SKIN:  No rashes, ulcers, or nodules. CNS:  Alert, awake, oriented to time, person, and place. Cranial nerves  are intact. Sensation is intact to light touch and pain. Motor:   Normal muscle strength and tone. Appropriate mood and affect. WORKUP:  EKG:  Sinus rhythm, cannot rule out old anterior infarction,  but there is a T-wave inversion on the inferolateral leads suggestive  for inferolateral ischemia. Compared to the EKG done two weeks ago at the [place] in [place], T-wave  inversion on the inferior lead, lead II, and aVF. Now, there is a  T-wave inversion now, but before, it was only nonspecific T-wave  abnormality on the inferior lead, so the T-wave inversion on the  inferior lead, lead II, and aVF are new. Otherwise, no other new acute  abnormality. Sodium 140, potassium 3.6, BUN 19, creatinine 0.8. Cardiac enzymes, troponin less than 0.01 x2. Hematology:  White blood  cells 8.7, hemoglobin 14, and platelets 574. CHEST X-RAY:  No acute abnormality noted and he had an echocardiogram  done here in 06/2019, ejection fraction 40% with cardiomyopathy. Cardiac catheterization reviewed from [place] done on 07/22/2019. Left  main patent, gives rise to LAD and left circumflex and %  occlusion, mid RCA also 100% occluded and the vein graft to the PDA  patent and LIMA to LAD patent, but ostial left circumflex 99% stenosed  and attempt to revascularize was not explained, was not written, but the  family stated that they have been informed of attempted open, but they  could not. ASSESSMENT:  1. Chest pain after trying to manipulate something over the head with  moving his arm.   2.  Dizziness

## 2019-08-06 NOTE — PROGRESS NOTES
Cardiology Progress Note      Patient:  Ashwin Fuchs  YOB: 1945  MRN: [id number]   Acct: [de-identified]  Admit Date:  8/5/2019  [facility]  Pt seen by dr Stephie Moss     Note per dr Governor Matson:  Presentation with chest pain and recent  cardiac catheterization in 07/2019 at [place] in [facility], had significant  obstructive lesion.     HISTORY OF PRESENT ILLNESS:  This is a 30-year-old  gentleman  who is known to have coronary artery disease, status post previous  bypass over 10 years ago and was under followup by the [place] for his  coronary artery problem and he has been having some shortness of breath  and worsening of shortness of breath on exertion and in view of that,  the patient had stress test and ended up having cardiac catheterization  on 07/22/2019 at [place] in [facility], and was discharged from there and has  been in usual state of health until today this morning. The patient was  trying to move things up by putting his hand up and he was trying to  move stuff. After he moved the stuff, he felt a little dizzy and felt  some chest pain on the right side and that lasted for around 5 to 10  minutes and resolved by itself and the dizziness also resolved after 5  to 10 minutes, but the patient, in view of his recent cardiac  catheterization finding and recommendation, got concerned, came to the  emergency room, and got admitted for further evaluation and management. The patient had a cardiac catheterization as I said.     The patient had cardiac catheterization in [facility] on 07/22/2019  after presentation with shortness of breath, abnormal stress test and  that showed significant obstructive lesion in the left circumflex 99%,  100% occlusion of LAD and RCA, but there is a graft to the PDA and graft  to the LAD, all are patent, but left circumflex is ostial 99% stenosed,  but it is not grafted.   So, in view of that, percutaneous intervention  with stent placement in the Mon Wed Fri    amLODIPine  10 mg Oral Daily    sodium chloride flush  10 mL Intravenous 2 times per day    famotidine  20 mg Oral BID    enoxaparin  40 mg Subcutaneous Q24H    carvedilol  12.5 mg Oral Daily    terazosin  2 mg Oral Nightly    pravastatin  20 mg Oral Daily         sodium chloride flush 10 mL PRN   magnesium hydroxide 30 mL Daily PRN   ondansetron 4 mg Q6H PRN   magnesium sulfate 1 g PRN   potassium chloride 40 mEq PRN   Or     potassium alternative oral replacement 40 mEq PRN   Or     potassium chloride 10 mEq PRN       Lab Data:    Cardiac Enzymes:  No results for input(s): CKTOTAL, CKMB, CKMBINDEX, TROPONINI in the last 72 hours. CBC:   Lab Results   Component Value Date    WBC 9.2 08/06/2019    RBC 4.54 08/06/2019    HGB 14.1 08/06/2019    HCT 43.3 08/06/2019     08/06/2019       CMP:  Lab Results   Component Value Date     08/06/2019    K 3.9 08/06/2019     08/06/2019    CO2 22 08/06/2019    BUN 17 08/06/2019    CREATININE 0.8 08/06/2019    LABGLOM >90 08/06/2019    GLUCOSE 110 08/06/2019    CALCIUM 9.0 08/06/2019       Hepatic Function Panel:  No results found for: ALKPHOS, ALT, AST, PROT, BILITOT, BILIDIR, IBILI, LABALBU    Magnesium:    Lab Results   Component Value Date    MG 1.9 08/06/2019       PT/INR:  No results found for: PROTIME, INR    HgBA1c:    Lab Results   Component Value Date    LABA1C 6.2 08/06/2019       FLP:  Lab Results   Component Value Date    TRIG 227 08/06/2019    HDL 40 08/06/2019    LDLCALC 54 08/06/2019       TSH:  No results found for: TSH      Assessment:    Chest pain - trop neg xs3  Abn EKG with new twave inversion inferior leads    Recent cath  - \"Cardiac catheterization reviewed from [place] done on 07/22/2019.   Left  main patent, gives rise to LAD and left circumflex and %  occlusion, mid RCA also 100% occluded and the vein graft to the PDA  patent and LIMA to LAD patent, but ostial left circumflex 99% stenosed  and attempt to revascularize was not explained, was not written, but the  family stated that they have been informed of attempted open, but they  could not\"    Hx CAD - s/p CABG  ICMP - ef 40 per echo 4/6/55  Chronic systolic CHF - compensated  HTN  HLD  Hx CVA    Plan:  · Cont asa/statin/BB/ace/norvasc  · Still waiting on cath firms from [facility] to review, we will get tomorrow per nursing staff - will have interventional cardiologist review         Electronically signed by Natalie Chiang PA-C on 8/6/2019 at 4:17 PM

## 2019-08-06 NOTE — CARE COORDINATION
Wednesday PM    Discharge Plan: Met with Aime Garces. He currently lives at home with his spouse. Plan is to return home at discharge. Denies need for DME or HH. Facesheet faxed to Philippe at the [facility] [phone] notifying them of his admission and his preference to stay here.      Evaluation: no

## 2019-08-07 PROBLEM — R09.89 RIGHT CAROTID BRUIT: Status: ACTIVE | Noted: 2019-08-07

## 2019-08-07 PROCEDURE — 99225 PR SBSQ OBSERVATION CARE/DAY 25 MINUTES: CPT | Performed by: INTERNAL MEDICINE

## 2019-08-07 PROCEDURE — 6360000002 HC RX W HCPCS: Performed by: INTERNAL MEDICINE

## 2019-08-07 PROCEDURE — 2580000003 HC RX 258: Performed by: INTERNAL MEDICINE

## 2019-08-07 PROCEDURE — 2060000000 HC ICU INTERMEDIATE R&B

## 2019-08-07 PROCEDURE — 6370000000 HC RX 637 (ALT 250 FOR IP): Performed by: PHYSICIAN ASSISTANT

## 2019-08-07 PROCEDURE — 6370000000 HC RX 637 (ALT 250 FOR IP): Performed by: INTERNAL MEDICINE

## 2019-08-07 PROCEDURE — G0378 HOSPITAL OBSERVATION PER HR: HCPCS

## 2019-08-07 PROCEDURE — 99221 1ST HOSP IP/OBS SF/LOW 40: CPT | Performed by: NEUROMUSCULOSKELETAL MEDICINE & OMM

## 2019-08-07 PROCEDURE — 99214 OFFICE O/P EST MOD 30 MIN: CPT | Performed by: NURSE PRACTITIONER

## 2019-08-07 PROCEDURE — 96372 THER/PROPH/DIAG INJ SC/IM: CPT

## 2019-08-07 RX ORDER — ASPIRIN 81 MG/1
81 TABLET ORAL DAILY
Status: DISCONTINUED | OUTPATIENT
Start: 2019-08-07 | End: 2019-08-08 | Stop reason: HOSPADM

## 2019-08-07 RX ADMIN — FAMOTIDINE 20 MG: 20 TABLET ORAL at 20:16

## 2019-08-07 RX ADMIN — PRAVASTATIN SODIUM 20 MG: 20 TABLET ORAL at 20:17

## 2019-08-07 RX ADMIN — TERAZOSIN 2 MG: 2 CAPSULE ORAL at 20:17

## 2019-08-07 RX ADMIN — ISOSORBIDE MONONITRATE 30 MG: 30 TABLET, EXTENDED RELEASE ORAL at 08:09

## 2019-08-07 RX ADMIN — Medication 10 ML: at 08:10

## 2019-08-07 RX ADMIN — AMLODIPINE BESYLATE 10 MG: 10 TABLET ORAL at 08:06

## 2019-08-07 RX ADMIN — Medication 10 ML: at 20:17

## 2019-08-07 RX ADMIN — LISINOPRIL 40 MG: 40 TABLET ORAL at 08:08

## 2019-08-07 RX ADMIN — ENOXAPARIN SODIUM 40 MG: 40 INJECTION SUBCUTANEOUS at 18:00

## 2019-08-07 RX ADMIN — FUROSEMIDE 20 MG: 20 TABLET ORAL at 08:08

## 2019-08-07 RX ADMIN — FAMOTIDINE 20 MG: 20 TABLET ORAL at 08:12

## 2019-08-07 RX ADMIN — ASPIRIN 81 MG: 81 TABLET ORAL at 08:36

## 2019-08-07 RX ADMIN — CARVEDILOL 12.5 MG: 12.5 TABLET ORAL at 08:10

## 2019-08-07 ASSESSMENT — PAIN SCALES - GENERAL
PAINLEVEL_OUTOF10: 0

## 2019-08-07 NOTE — PLAN OF CARE
Problem: Falls - Risk of:  Goal: Will remain free from falls  Description  Will remain free from falls  8/7/2019 0222 by Javier Quezada RN  Outcome: Met This Shift  Note:   Patient remains free from falls this shift. Fall precautions in place with bed/chair exit alarmed. Fall sign posted and fall armband in place. Nonskid footwear used with transferring and ambulating. Educated patient to use call light when in need of staff assistance with transferring, ambulating, and other activities of daily living. Patient appropriately uses call light this shift. Problem: Falls - Risk of:  Goal: Absence of physical injury  Description  Absence of physical injury  8/7/2019 0222 by Javier Quezada RN  Outcome: Met This Shift  Note:   Hourly rounding in place to anticipate patient needs, such as assistance with pain, toileting, or repositioning, in order to decrease risk for injuries such as falls. Problem: Daily Care:  Goal: Daily care needs are met  Description  Daily care needs are met  8/7/2019 0222 by Javier Quezada RN  Outcome: Met This Shift  Note:   Provided patient assistance with activities of daily living when needed by the patient. Patient able to complete most daily care needs independently with stand-by staff assistance. Problem: Pain:  Goal: Patient's pain/discomfort is manageable  Description  Patient's pain/discomfort is manageable  8/7/2019 0222 by Javier Quezada RN  Outcome: Met This Shift  Note:   Patient does not report any pain or discomfort this shift. Problem: Discharge Planning:  Goal: Patients continuum of care needs are met  Description  Patients continuum of care needs are met  8/7/2019 0222 by Javier Quezada RN  Outcome: Met This Shift  Note:   Patient plans to discharge home with wife. Care plan reviewed with patient. Patient verbalized understanding of the plan of care and contributed to goal setting.

## 2019-08-07 NOTE — PROGRESS NOTES
isosorbide mononitrate  30 mg Oral Daily    lisinopril  40 mg Oral Daily    furosemide  20 mg Oral Once per day on Mon Wed Fri    amLODIPine  10 mg Oral Daily    sodium chloride flush  10 mL Intravenous 2 times per day    famotidine  20 mg Oral BID    enoxaparin  40 mg Subcutaneous Q24H    carvedilol  12.5 mg Oral Daily    terazosin  2 mg Oral Nightly    pravastatin  20 mg Oral Daily         sodium chloride flush 10 mL PRN   magnesium hydroxide 30 mL Daily PRN   ondansetron 4 mg Q6H PRN   magnesium sulfate 1 g PRN   potassium chloride 40 mEq PRN   Or     potassium alternative oral replacement 40 mEq PRN   Or     potassium chloride 10 mEq PRN       Diagnostics:  EKG:  NSR    Echo: 6/3/19  Summary   Ejection fraction is visually estimated at 40%.   There was moderate global hypokinesis of the left ventricle.   The aortic valve leaflets were not well visualized.   Aortic valve appears tricuspid.   Aortic valve leaflets are somewhat thickened.      Signature      ----------------------------------------------------------------   Electronically signed by Brandt Porter MD       Lab Data:    Cardiac Enzymes:  No results for input(s): CKTOTAL, CKMB, CKMBINDEX, TROPONINI in the last 72 hours.     CBC:   Lab Results   Component Value Date    WBC 9.2 08/06/2019    RBC 4.54 08/06/2019    HGB 14.1 08/06/2019    HCT 43.3 08/06/2019     08/06/2019       CMP:  Lab Results   Component Value Date     08/06/2019    K 3.9 08/06/2019     08/06/2019    CO2 22 08/06/2019    BUN 17 08/06/2019    CREATININE 0.8 08/06/2019    LABGLOM >90 08/06/2019    GLUCOSE 110 08/06/2019    CALCIUM 9.0 08/06/2019       Hepatic Function Panel:  No results found for: ALKPHOS, ALT, AST, PROT, BILITOT, BILIDIR, IBILI, LABALBU    Magnesium:    Lab Results   Component Value Date    MG 1.9 08/06/2019       PT/INR:  No results found for: PROTIME, INR    HgBA1c:    Lab Results   Component Value Date    LABA1C 6.2 08/06/2019

## 2019-08-08 ENCOUNTER — APPOINTMENT (OUTPATIENT)
Dept: INTERVENTIONAL RADIOLOGY/VASCULAR | Age: 74
DRG: 303 | End: 2019-08-08
Payer: MEDICARE

## 2019-08-08 VITALS
BODY MASS INDEX: 36.64 KG/M2 | WEIGHT: 247.4 LBS | TEMPERATURE: 97.4 F | DIASTOLIC BLOOD PRESSURE: 82 MMHG | HEART RATE: 62 BPM | RESPIRATION RATE: 18 BRPM | OXYGEN SATURATION: 97 % | SYSTOLIC BLOOD PRESSURE: 177 MMHG | HEIGHT: 69 IN

## 2019-08-08 LAB
ANION GAP SERPL CALCULATED.3IONS-SCNC: 14 MEQ/L (ref 8–16)
BUN BLDV-MCNC: 18 MG/DL (ref 7–22)
CALCIUM SERPL-MCNC: 9.3 MG/DL (ref 8.5–10.5)
CHLORIDE BLD-SCNC: 102 MEQ/L (ref 98–111)
CO2: 23 MEQ/L (ref 23–33)
CREAT SERPL-MCNC: 1 MG/DL (ref 0.4–1.2)
ERYTHROCYTE [DISTWIDTH] IN BLOOD BY AUTOMATED COUNT: 13.4 % (ref 11.5–14.5)
ERYTHROCYTE [DISTWIDTH] IN BLOOD BY AUTOMATED COUNT: 45.7 FL (ref 35–45)
GFR SERPL CREATININE-BSD FRML MDRD: 73 ML/MIN/1.73M2
GLUCOSE BLD-MCNC: 114 MG/DL (ref 70–108)
HCT VFR BLD CALC: 44.5 % (ref 42–52)
HEMOGLOBIN: 14.8 GM/DL (ref 14–18)
MCH RBC QN AUTO: 30.9 PG (ref 26–33)
MCHC RBC AUTO-ENTMCNC: 33.3 GM/DL (ref 32.2–35.5)
MCV RBC AUTO: 92.9 FL (ref 80–94)
PLATELET # BLD: 202 THOU/MM3 (ref 130–400)
PMV BLD AUTO: 10.1 FL (ref 9.4–12.4)
POTASSIUM SERPL-SCNC: 3.6 MEQ/L (ref 3.5–5.2)
RBC # BLD: 4.79 MILL/MM3 (ref 4.7–6.1)
SODIUM BLD-SCNC: 139 MEQ/L (ref 135–145)
WBC # BLD: 8.7 THOU/MM3 (ref 4.8–10.8)

## 2019-08-08 PROCEDURE — 99214 OFFICE O/P EST MOD 30 MIN: CPT | Performed by: PHYSICIAN ASSISTANT

## 2019-08-08 PROCEDURE — 6370000000 HC RX 637 (ALT 250 FOR IP): Performed by: INTERNAL MEDICINE

## 2019-08-08 PROCEDURE — 80048 BASIC METABOLIC PNL TOTAL CA: CPT

## 2019-08-08 PROCEDURE — 99239 HOSP IP/OBS DSCHRG MGMT >30: CPT | Performed by: INTERNAL MEDICINE

## 2019-08-08 PROCEDURE — 36415 COLL VENOUS BLD VENIPUNCTURE: CPT

## 2019-08-08 PROCEDURE — 2580000003 HC RX 258: Performed by: INTERNAL MEDICINE

## 2019-08-08 PROCEDURE — 93880 EXTRACRANIAL BILAT STUDY: CPT

## 2019-08-08 PROCEDURE — G0378 HOSPITAL OBSERVATION PER HR: HCPCS

## 2019-08-08 PROCEDURE — 96372 THER/PROPH/DIAG INJ SC/IM: CPT

## 2019-08-08 PROCEDURE — 85027 COMPLETE CBC AUTOMATED: CPT

## 2019-08-08 PROCEDURE — 99232 SBSQ HOSP IP/OBS MODERATE 35: CPT | Performed by: NEUROMUSCULOSKELETAL MEDICINE & OMM

## 2019-08-08 PROCEDURE — 2709999900 HC NON-CHARGEABLE SUPPLY

## 2019-08-08 RX ORDER — ISOSORBIDE MONONITRATE 60 MG/1
60 TABLET, EXTENDED RELEASE ORAL DAILY
Qty: 30 TABLET | Refills: 3 | Status: SHIPPED | OUTPATIENT
Start: 2019-08-09 | End: 2022-09-22

## 2019-08-08 RX ORDER — ISOSORBIDE MONONITRATE 60 MG/1
60 TABLET, EXTENDED RELEASE ORAL DAILY
Status: DISCONTINUED | OUTPATIENT
Start: 2019-08-09 | End: 2019-08-08 | Stop reason: HOSPADM

## 2019-08-08 RX ADMIN — FAMOTIDINE 20 MG: 20 TABLET ORAL at 08:53

## 2019-08-08 RX ADMIN — ASPIRIN 81 MG: 81 TABLET ORAL at 08:46

## 2019-08-08 RX ADMIN — LISINOPRIL 40 MG: 40 TABLET ORAL at 08:49

## 2019-08-08 RX ADMIN — AMLODIPINE BESYLATE 10 MG: 10 TABLET ORAL at 08:46

## 2019-08-08 RX ADMIN — CARVEDILOL 12.5 MG: 12.5 TABLET ORAL at 08:47

## 2019-08-08 RX ADMIN — Medication 10 ML: at 08:50

## 2019-08-08 RX ADMIN — ISOSORBIDE MONONITRATE 30 MG: 30 TABLET, EXTENDED RELEASE ORAL at 08:48

## 2019-08-08 ASSESSMENT — PAIN SCALES - GENERAL
PAINLEVEL_OUTOF10: 0
PAINLEVEL_OUTOF10: 0

## 2019-08-08 NOTE — PLAN OF CARE
Problem: Falls - Risk of:  Goal: Will remain free from falls  Description  Will remain free from falls  8/8/2019 1044 by Julee Dash RN  Outcome: Ongoing  Note:   Call light in reach, bed in lowest position, and bed alarm activated. Education given on use of call light before ambulation and when in need of assistance. Patient expressed understanding. Hourly visual checks performed and charted. Toileting offered to patient. No falls this shift, at any time. Arm band and falling star in place. Will continue to monitor. Problem: Falls - Risk of:  Goal: Absence of physical injury  Description  Absence of physical injury  8/8/2019 1044 by Velasquez Marinelli RN  Outcome: Ongoing  Note:   Patient remains free of injury this shift. Call light within reach and hourly rounds performed. Problem: Daily Care:  Goal: Daily care needs are met  Description  Daily care needs are met  8/8/2019 1044 by Velasquez Marinelli RN  Outcome: Ongoing  Note:   Patient able to complete activities of daily living and other daily care needs with minimal stand by assistance. Problem: Pain:  Goal: Patient's pain/discomfort is manageable  Description  Patient's pain/discomfort is manageable  8/8/2019 1044 by Velasquez Marinelli RN  Outcome: Ongoing  Note:   Patient able to use 0-10 pain scale. Denies pain at this time. Agreeable to take PRN pain medications. Problem: Discharge Planning:  Goal: Patients continuum of care needs are met  Description  Patients continuum of care needs are met  8/8/2019 1044 by Velasquez Marinelli RN  Outcome: Ongoing  Note:   Discharge planning in process and discussed with patient/family. Social work consulted for any additional needs. Care manager aware of discharge needs. Care plan reviewed with patient. Patient verbalizes understanding of the plan of care and contributed to goal setting.

## 2019-08-08 NOTE — PROGRESS NOTES
Cardiology Progress Note      Patient:  Irene Reed  YOB: 1945  MRN: [id number]   Acct: [de-identified]  Admit Date:  8/5/2019  [facility]  Pt seen by dr Tessa Garg     Note per dr Jose Morales:  Presentation with chest pain and recent  cardiac catheterization in 07/2019 at [facility] in [place], had significant  obstructive lesion.     HISTORY OF PRESENT ILLNESS:  This is a 27-year-old  gentleman  who is known to have coronary artery disease, status post previous  bypass over 10 years ago and was under followup by the [facility] for his  coronary artery problem and he has been having some shortness of breath  and worsening of shortness of breath on exertion and in view of that,  the patient had stress test and ended up having cardiac catheterization  on 07/22/2019 at [facility] in [place], and was discharged from there and has  been in usual state of health until today this morning. The patient was  trying to move things up by putting his hand up and he was trying to  move stuff. After he moved the stuff, he felt a little dizzy and felt  some chest pain on the right side and that lasted for around 5 to 10  minutes and resolved by itself and the dizziness also resolved after 5  to 10 minutes, but the patient, in view of his recent cardiac  catheterization finding and recommendation, got concerned, came to the  emergency room, and got admitted for further evaluation and management. The patient had a cardiac catheterization as I said.     The patient had cardiac catheterization in [place] in [facility] on 07/22/2019  after presentation with shortness of breath, abnormal stress test and  that showed significant obstructive lesion in the left circumflex 99%,  100% occlusion of LAD and RCA, but there is a graft to the PDA and graft  to the LAD, all are patent, but left circumflex is ostial 99% stenosed,  but it is not grafted.   So, in view of that, percutaneous intervention  with stent placement in the

## 2019-08-08 NOTE — CARE COORDINATION
Transitions Interventions  No Identified Needs         Follow Up  No future appointments. Health Maintenance  There are no preventive care reminders to display for this patient.     David Hidalgo RN

## 2019-08-17 NOTE — DISCHARGE SUMMARY
Hospital Medicine Discharge Summary      Patient Identification:   Romulo Arriaga   :   MRN: [id number]   Account: [de-identified]      Patient's PCP: Carlos Lee MD    Admit Date: 2019     Discharge Date: 2019      Admitting Physician: Joel Bee DO     Discharge Physician: Elvin Wyatt MD     Discharge Diagnoses: Active Hospital Problems    Diagnosis Date Noted    Right carotid bruit [R09.89] 2019     Priority: Low    Chest pain due to myocardial ischemia [I25.9] 2019    Hyperglycemia [R73.9] 2019    Chest pain [R07.9] 2019    Hyperlipidemia [E78.5]     Hypertension [I10]     Coronary artery disease involving native coronary artery of native heart [I25.10]     Ischemic cardiomyopathy [I25.5]     Chronic combined systolic and diastolic congestive heart failure ([address] [I50.42]        The patient was seen and examined on day of discharge and this discharge summary is in conjunction with any daily progress note from day of discharge. Hospital Course:   75 y. o. male who presented to [facility] with \"evaluation of right arm pit pain. He presents as a referral from the [facility] clinic for further evaluation and treatment here. Today, he felt light headed earlier, but states he is currently asymptomatic. Patient had a cath done 2 weeks ago and states he was referred to [facility] to determine treatment of vessel that was discovered to be 99% blocked. Patient had open heart surgery 10 years ago. He did well while admitted. Trop neg x3. Cardiology reviewed cath films and proposed PCI. Since he was doing well he wanted to follow the original plan aand go to [facility] in [facility]. Set up a LHC the next day. He was discharged and drove to [place] that night. Wife has contacted me and she reported that they had successful PCI.        Exam:     Vitals:  Vitals:    19 2332 19 0410 19 0413 19 0811   BP: (!) 148/65  (!) mouth daily             aspirin 81 MG tablet  Take 81 mg by mouth daily             carvedilol (COREG) 12.5 MG tablet  Take 12.5 mg by mouth daily             furosemide (LASIX) 20 MG tablet  Take 1 tablet by mouth three times a week             isosorbide mononitrate (IMDUR) 60 MG extended release tablet  Take 1 tablet by mouth daily             lisinopril (PRINIVIL;ZESTRIL) 40 MG tablet  Take 40 mg by mouth daily             pravastatin (PRAVACHOL) 40 MG tablet  Take 20 mg by mouth daily              terazosin (HYTRIN) 2 MG capsule  Take 2 mg by mouth nightly                 Time Spent on discharge is more than 30 minutes in the examination, evaluation, counseling and review of medications and discharge plan. Signed: Thank you Carlos Lee MD for the opportunity to be involved in this patient's care.     Electronically signed by Elvin Wyatt MD on 8/17/2019 at 8:55 AM

## 2020-11-03 PROBLEM — J18.9 PNEUMONIA OF LOWER LOBE DUE TO INFECTIOUS ORGANISM: Status: RESOLVED | Noted: 2019-05-31 | Resolved: 2020-11-03

## 2021-03-02 ENCOUNTER — CLINICAL DOCUMENTATION (OUTPATIENT)
Dept: SPIRITUAL SERVICES | Facility: CLINIC | Age: 76
End: 2021-03-02

## 2021-03-02 NOTE — ACP (ADVANCE CARE PLANNING)
Advance Care Planning     General Advance Care Planning (ACP) Conversation    Date of Conversation: 3/2/2021  Conducted with: Patient with Decision Making Capacity    Healthcare Decision Maker:    Primary Decision Maker: Zofia Mccracken - [phone]    Secondary Decision Maker: [facility] - [phone]    Supplemental (Other) Decision Maker: Lakeisha Smith - Other - [phone]    Content/Action Overview:  Has NO ACP documents/care preferences - information provided, considering goals and options   met with Panfilo Rubi and his wife, Kwabena Montalvo, for a scheduled appointment to provide support in the completion of Advance Directives documents as part of an Advance Care Planning conversation. * Completed: ACP conversation  * Completed: [address] of    Note: \"Ventilation is ok for a short period of time as long as progress is being made. \"  * Completed: Living Will Declaration   Note: \"Ventilation is ok for a short period of time as long as progress is being made. \"  * Updated: Yasmin contact information    Length of Voluntary ACP Conversation in minutes:  1 hour; 15 minutes    McCook Nelly

## 2022-08-22 ENCOUNTER — HOSPITAL ENCOUNTER (EMERGENCY)
Age: 77
Discharge: HOME | End: 2022-08-22
Payer: OTHER GOVERNMENT

## 2022-08-22 VITALS
OXYGEN SATURATION: 96 % | TEMPERATURE: 97.9 F | HEART RATE: 88 BPM | RESPIRATION RATE: 18 BRPM | DIASTOLIC BLOOD PRESSURE: 62 MMHG | SYSTOLIC BLOOD PRESSURE: 154 MMHG

## 2022-08-22 DIAGNOSIS — M25.562 ACUTE PAIN OF BOTH KNEES: Primary | ICD-10-CM

## 2022-08-22 DIAGNOSIS — M25.561 ACUTE PAIN OF BOTH KNEES: Primary | ICD-10-CM

## 2022-08-22 PROCEDURE — 6360000002 HC RX W HCPCS: Performed by: EMERGENCY MEDICINE

## 2022-08-22 PROCEDURE — 99213 OFFICE O/P EST LOW 20 MIN: CPT | Performed by: EMERGENCY MEDICINE

## 2022-08-22 PROCEDURE — 96372 THER/PROPH/DIAG INJ SC/IM: CPT

## 2022-08-22 PROCEDURE — 99203 OFFICE O/P NEW LOW 30 MIN: CPT

## 2022-08-22 RX ORDER — METHYLPREDNISOLONE ACETATE 80 MG/ML
80 INJECTION, SUSPENSION INTRA-ARTICULAR; INTRALESIONAL; INTRAMUSCULAR; SOFT TISSUE ONCE
Status: COMPLETED | OUTPATIENT
Start: 2022-08-22 | End: 2022-08-22

## 2022-08-22 RX ORDER — LIDOCAINE 4 G/G
1 PATCH TOPICAL DAILY
Qty: 30 PATCH | Refills: 0 | Status: SHIPPED | OUTPATIENT
Start: 2022-08-22 | End: 2022-09-21

## 2022-08-22 RX ADMIN — METHYLPREDNISOLONE ACETATE 80 MG: 80 INJECTION, SUSPENSION INTRA-ARTICULAR; INTRALESIONAL; INTRAMUSCULAR; SOFT TISSUE at 13:13

## 2022-08-22 ASSESSMENT — PAIN - FUNCTIONAL ASSESSMENT: PAIN_FUNCTIONAL_ASSESSMENT: 0-10

## 2022-08-22 ASSESSMENT — PAIN DESCRIPTION - LOCATION: LOCATION: ANKLE

## 2022-08-22 ASSESSMENT — ENCOUNTER SYMPTOMS
SHORTNESS OF BREATH: 0
COUGH: 0

## 2022-08-22 ASSESSMENT — PAIN SCALES - GENERAL: PAINLEVEL_OUTOF10: 8

## 2022-08-22 ASSESSMENT — PAIN DESCRIPTION - PAIN TYPE: TYPE: ACUTE PAIN

## 2022-08-22 ASSESSMENT — PAIN DESCRIPTION - DESCRIPTORS: DESCRIPTORS: SHARP

## 2022-08-22 ASSESSMENT — PAIN DESCRIPTION - FREQUENCY: FREQUENCY: CONTINUOUS

## 2022-08-22 NOTE — ED PROVIDER NOTES
times a week    ISOSORBIDE MONONITRATE (IMDUR) 60 MG EXTENDED RELEASE TABLET    Take 1 tablet by mouth daily    LISINOPRIL (PRINIVIL;ZESTRIL) 40 MG TABLET    Take 40 mg by mouth daily    PRAVASTATIN (PRAVACHOL) 40 MG TABLET    Take 20 mg by mouth daily     TERAZOSIN (HYTRIN) 2 MG CAPSULE    Take 2 mg by mouth nightly       ALLERGIES     Patient is has No Known Allergies. Patients   There is no immunization history on file for this patient. FAMILY HISTORY     Patient's family history includes Asthma in his brother; Heart Disease in his father and mother; High Blood Pressure in his father. SOCIAL HISTORY     Patient  reports that he has never smoked. He has never used smokeless tobacco. He reports that he does not drink alcohol and does not use drugs. PHYSICAL EXAM     ED TRIAGE VITALS  BP: (!) 154/62, Temp: 97.9 °F (36.6 °C), Heart Rate: 88, Resp: 18, SpO2: 96 %,Estimated body mass index is 36.53 kg/m² as calculated from the following:    Height as of 8/5/19: 5' 9\" (1.753 m). Weight as of 8/8/19: 247 lb 6.4 oz (112.2 kg). ,No LMP for male patient. Physical Exam  Constitutional:       General: He is not in acute distress. Appearance: Normal appearance. HENT:      Head: Normocephalic. Cardiovascular:      Rate and Rhythm: Normal rate and regular rhythm. Pulses: Normal pulses. Heart sounds: Normal heart sounds. Pulmonary:      Effort: Pulmonary effort is normal.      Breath sounds: Normal breath sounds. Musculoskeletal:      Right knee: No swelling, effusion or erythema. Decreased range of motion. No tenderness. Normal alignment. Left knee: No swelling, effusion or erythema. Decreased range of motion. No tenderness. Normal alignment. Skin:     General: Skin is warm and dry. Neurological:      General: No focal deficit present. Mental Status: He is alert.    Psychiatric:         Mood and Affect: Mood normal.       DIAGNOSTIC RESULTS     Labs:No results found for this visit on 08/22/22. IMAGING:    No orders to display         EKG:      URGENT CARE COURSE:     Vitals:    08/22/22 1249   BP: (!) 154/62   Pulse: 88   Resp: 18   Temp: 97.9 °F (36.6 °C)   TempSrc: Temporal   SpO2: 96%       Medications   methylPREDNISolone acetate (DEPO-MEDROL) injection 80 mg (80 mg IntraMUSCular Given 8/22/22 1313)            PROCEDURES:  None    FINAL IMPRESSION      1. Acute pain of both knees          DISPOSITION/ PLAN   Patient presents for bilateral knee pain. There is no injury. There is no redness or swelling. No increased warmth. This could be gout flare versus arthritis. We will treat the patient with Depo-Medrol injection at the urgent care. Advised to use Tylenol over-the-counter for pain. Lidocaine patches as prescribed. [facility]. Return if no improvement, or if symptoms worsen.         PATIENT REFERRED TO:  Guillermo Matson MD  [id number] [facility] [id number]      DISCHARGE MEDICATIONS:  New Prescriptions    LIDOCAINE 4 % EXTERNAL PATCH    Place 1 patch onto the skin daily       Discontinued Medications    No medications on file       Current Discharge Medication List          MARTIN Muller CNP    (Please note that portions of this note were completed with a voice recognition program. Efforts were made to edit the dictations but occasionally words are mis-transcribed.)           MARTIN Muller CNP  08/22/22 MARTIN Renee CNP  08/22/22 [id number]

## 2022-08-22 NOTE — DISCHARGE INSTRUCTIONS
Lidocaine patches to the sore areas.   You may wear these for 16 hours    Ice to the areas frequently    Tylenol every 6-8 hours as needed for pain    [facility] if no improvement of symptoms 2 to 3 days, sooner if worse

## 2022-09-19 ENCOUNTER — TELEPHONE (OUTPATIENT)
Dept: CARDIOLOGY CLINIC | Age: 77
End: 2022-09-19

## 2022-09-19 NOTE — TELEPHONE ENCOUNTER
Received [facility], tried to contact pt to set up an appt LMOM to return call.  Can by assigned to any provider

## 2022-09-22 ENCOUNTER — OFFICE VISIT (OUTPATIENT)
Dept: CARDIOLOGY CLINIC | Age: 77
End: 2022-09-22
Payer: OTHER GOVERNMENT

## 2022-09-22 VITALS
WEIGHT: 247.4 LBS | HEART RATE: 68 BPM | SYSTOLIC BLOOD PRESSURE: 98 MMHG | HEIGHT: 68 IN | DIASTOLIC BLOOD PRESSURE: 58 MMHG | BODY MASS INDEX: 37.5 KG/M2

## 2022-09-22 DIAGNOSIS — R06.02 SOB (SHORTNESS OF BREATH) ON EXERTION: Primary | ICD-10-CM

## 2022-09-22 DIAGNOSIS — I25.83 CORONARY ARTERY DISEASE DUE TO LIPID RICH PLAQUE: ICD-10-CM

## 2022-09-22 DIAGNOSIS — I25.10 CORONARY ARTERY DISEASE DUE TO LIPID RICH PLAQUE: ICD-10-CM

## 2022-09-22 PROCEDURE — 93000 ELECTROCARDIOGRAM COMPLETE: CPT | Performed by: INTERNAL MEDICINE

## 2022-09-22 PROCEDURE — 99204 OFFICE O/P NEW MOD 45 MIN: CPT | Performed by: INTERNAL MEDICINE

## 2022-09-22 PROCEDURE — 1123F ACP DISCUSS/DSCN MKR DOCD: CPT | Performed by: INTERNAL MEDICINE

## 2022-09-22 RX ORDER — ALOGLIPTIN 12.5 MG/1
12.5 TABLET, FILM COATED ORAL DAILY
COMMUNITY

## 2022-09-22 RX ORDER — LISINOPRIL 40 MG/1
40 TABLET ORAL DAILY
Qty: 90 TABLET | Refills: 1 | Status: SHIPPED | OUTPATIENT
Start: 2022-09-22

## 2022-09-22 RX ORDER — CARVEDILOL 12.5 MG/1
6.25 TABLET ORAL DAILY
Qty: 90 TABLET | Refills: 1 | Status: SHIPPED | OUTPATIENT
Start: 2022-09-22 | End: 2022-09-26 | Stop reason: DRUGHIGH

## 2022-09-22 RX ORDER — ATORVASTATIN CALCIUM 40 MG/1
40 TABLET, FILM COATED ORAL DAILY
Qty: 90 TABLET | Refills: 1 | Status: SHIPPED | OUTPATIENT
Start: 2022-09-22

## 2022-09-22 RX ORDER — ATORVASTATIN CALCIUM 40 MG/1
40 TABLET, FILM COATED ORAL DAILY
COMMUNITY
End: 2022-09-22 | Stop reason: SDUPTHER

## 2022-09-22 RX ORDER — ALLOPURINOL 100 MG/1
100 TABLET ORAL DAILY
COMMUNITY

## 2022-09-22 NOTE — PROGRESS NOTES
[id number] [facility] [address] [place] [address]  Dept: [phone]  Dept Fax: [phone]  Loc: [phone]    Visit Date: 9/22/2022    Mr. Nesha Ash is a 65 y.o. male  who presented for:  Chief Complaint   Patient presents with    New Patient       HPI:   64 yo M c hx of CAD s/p CABG in 2009, PCI of LCx in 2019, HTN, HLD, Stroke, DM, CANDY on CPAP is here to establish cardiology. Patient used to follow up at [facility] and now wants to follow up here due to long driving distance. He also had diastolic dysfunction. Present with wife. He currently is euvolemic. Denies any chest pain, sob, palpitations, lightheadedness, dizziness, orthopnea, PND or pedal edema.            Current Outpatient Medications:     alogliptin (NESINA) 12.5 MG TABS tablet, Take 12.5 mg by mouth daily, Disp: , Rfl:     atorvastatin (LIPITOR) 40 MG tablet, Take 40 mg by mouth daily, Disp: , Rfl:     metFORMIN (GLUCOPHAGE) 500 MG tablet, Take 500 mg by mouth 2 times daily (with meals), Disp: , Rfl:     fluorometholone (FML) 0.25 % ophthalmic suspension, 1 drop 4 times daily, Disp: , Rfl:     COLCHICINE PO, Take 1.2 mg by mouth, Disp: , Rfl:     vitamin D 25 MCG (1000 UT) CAPS, Take by mouth, Disp: , Rfl:     allopurinol (ZYLOPRIM) 100 MG tablet, Take 100 mg by mouth daily, Disp: , Rfl:     carvedilol (COREG) 12.5 MG tablet, Take 6.25 mg by mouth daily, Disp: , Rfl:     aspirin 81 MG tablet, Take 81 mg by mouth daily, Disp: , Rfl:     furosemide (LASIX) 20 MG tablet, Take 1 tablet by mouth three times a week, Disp: 60 tablet, Rfl: 3    terazosin (HYTRIN) 2 MG capsule, Take 2 mg by mouth nightly, Disp: , Rfl:     lisinopril (PRINIVIL;ZESTRIL) 40 MG tablet, Take 40 mg by mouth daily, Disp: , Rfl:     Past Medical History  Nato Buchanan  has a past medical history of Arthritis, CAD (coronary artery disease), Chronic combined systolic and diastolic congestive heart failure ([facility] [address] Chronic kidney disease, Chronic kidney disease, Hyperlipidemia, Hypertension, Pneumonia, and Stroke ([facility] [address] Social History  Velma Langley  reports that he has never smoked. He has never used smokeless tobacco. He reports that he does not drink alcohol and does not use drugs. Family History  Velma Langley family history includes Asthma in his brother; Heart Disease in his father and mother; High Blood Pressure in his father. Past Surgical History   Past Surgical History:   Procedure Laterality Date    CARDIAC SURGERY      COLONOSCOPY      VASCULAR SURGERY      VEIN BYPASS SURGERY         Subjective:     REVIEW OF SYSTEMS  Constitutional: denies sweats, chills and fever  HENT: denies  congestion, sinus pressure, sneezing and sore throat. Eyes: denies  pain, discharge, redness and itching. Respiratory: denies apnea, cough  Gastrointestinal: denies blood in stool, constipation, diarrhea   Endocrine: denies cold intolerance, heat intolerance, polydipsia. Genitourinary: denies dysuria, enuresis, flank pain and hematuria. Musculoskeletal: denies arthralgias, joint swelling and neck pain. Neurological: denies numbness and headaches. Psychiatric/Behavioral: denies agitation, confusion, decreased concentration and dysphoric mood    All others reviewed and are negative. Objective:     BP (!) 98/58   Pulse 68   Ht 5' 8\" (1.727 m)   Wt 247 lb 6.4 oz (112.2 kg)   BMI 37.62 kg/m²     Wt Readings from Last 3 Encounters:   09/22/22 247 lb 6.4 oz (112.2 kg)   08/08/19 247 lb 6.4 oz (112.2 kg)   06/03/19 249 lb 14.4 oz (113.4 kg)     BP Readings from Last 3 Encounters:   09/22/22 (!) 98/58   08/22/22 (!) 154/62   08/08/19 (!) 177/82       PHYSICAL EXAM  Constitutional: Oriented to person, place, and time. Appears well-developed and well-nourished. HENT:   Head: Normocephalic and atraumatic. Eyes: EOM are normal. Pupils are equal, round, and reactive to light. Neck: Normal range of motion. Neck supple. No JVD present. Cardiovascular: Normal rate , normal heart sounds and intact distal pulses. Pulmonary/Chest: Effort normal and breath sounds normal. No respiratory distress. No wheezes. No rales. Abdominal: Soft. Bowel sounds are normal. No distension. There is no tenderness. Musculoskeletal: Normal range of motion. No edema. Neurological: Alert and oriented to person, place, and time. No cranial nerve deficit. Coordination normal.   Skin: Skin is warm and dry. Psychiatric: Normal mood and affect.        No results found for: CKTOTAL, CKMB, CKMBINDEX    Lab Results   Component Value Date/Time    WBC 8.7 08/08/2019 03:39 AM    RBC 4.79 08/08/2019 03:39 AM    HGB 14.8 08/08/2019 03:39 AM    HCT 44.5 08/08/2019 03:39 AM    MCV 92.9 08/08/2019 03:39 AM    MCH 30.9 08/08/2019 03:39 AM    MCHC 33.3 08/08/2019 03:39 AM    RDW 11.9 05/31/2019 01:31 PM     08/08/2019 03:39 AM    MPV 10.1 08/08/2019 03:39 AM       Lab Results   Component Value Date/Time     08/08/2019 03:39 AM    K 3.6 08/08/2019 03:39 AM    K 3.9 08/06/2019 03:26 AM     08/08/2019 03:39 AM    CO2 23 08/08/2019 03:39 AM    BUN 18 08/08/2019 03:39 AM    CREATININE 1.0 08/08/2019 03:39 AM    CALCIUM 9.3 08/08/2019 03:39 AM    LABGLOM 73 08/08/2019 03:39 AM    GLUCOSE 114 08/08/2019 03:39 AM       No results found for: ALKPHOS, ALT, AST, PROT, BILITOT, BILIDIR, IBILI, LABALBU    Lab Results   Component Value Date/Time    MG 1.9 08/06/2019 03:26 AM       No results found for: INR, PROTIME      Lab Results   Component Value Date/Time    LABA1C 6.2 08/06/2019 03:26 AM       Lab Results   Component Value Date/Time    TRIG 227 08/06/2019 03:26 AM    HDL 40 08/06/2019 03:26 AM    LDLCALC 54 08/06/2019 03:26 AM       No results found for: TSH      Testing Reviewed:      I haveindividually reviewed the below cardiac tests    EKG:    ECHO: Results for orders placed during the hospital encounter of 05/31/19    Echocardiogram 2D/ M-Mode/ Colorflow/ Do    Narrative  Transthoracic Echocardiography Report (TTE)    Demographics    Patient Name    My Rain Gender               Male    MR #            [id number]      Race                     Ethnicity    Account #       [de-identified]      Room Number          0011    Accession       [id number]      Date of Study        06/03/2019  Number    Date of Birth   1945     Referring Physician  Eric Conley MD    Age             75 year(s)     [facility]    Interpreting         Echo reader of the  Physician            week  Coco Shay MD    Procedure    Type of Study    TTE procedure:ECHOCARDIOGRAM COMPLETE 2D W DOPPLER W COLOR. Procedure Date  Date: 06/03/2019 Start: 08:42 AM    Study Location: Bedside  Technical Quality: Limited visualization due to lung interface. Indications:Congestive heart failure. Additional Medical History:Hypoxia, Pneumonia, Hypertension, Hyperlipidemia,  Chronic kidney disease, Coronary artery disease    Patient Status: Routine    Height: 69 inches Weight: 249.01 pounds BSA: 2.27 m^2 BMI: 36.77 kg/m^2    BP: 182/88 mmHg    Conclusions    Summary  Ejection fraction is visually estimated at 40%. There was moderate global hypokinesis of the left ventricle. The aortic valve leaflets were not well visualized. Aortic valve appears tricuspid. Aortic valve leaflets are somewhat thickened. Signature    ----------------------------------------------------------------  Electronically signed by Coco Shay MD (Interpreting  physician) on 06/03/2019 at 04:35 PM  ----------------------------------------------------------------    Findings    Mitral Valve  Calcification of the mitral valve noted. Trace mitral regurgitation is present. Aortic Valve  The aortic valve leaflets were not well visualized. Aortic valve appears tricuspid. Aortic valve leaflets are somewhat thickened.     Tricuspid Valve  The tricuspid valve structure was normal with normal leaflet separation. DOPPLER: There was no evidence of tricuspid stenosis. There was no  evidence of tricuspid regurgitation. Pulmonic Valve  The pulmonic valve was not well visualized . Trivial pulmonic regurgitation visualized. Left Atrium  Left atrial size was normal.    Left Ventricle  Ejection fraction is visually estimated at 40%. There was moderate global hypokinesis of the left ventricle. Right Atrium  Right atrial size was normal.    Right Ventricle  The right ventricular size was normal with normal systolic function and  wall thickness. Pericardial Effusion  The pericardium was normal in appearance with no evidence of a pericardial  effusion. Pleural Effusion  No evidence of pleural effusion. Aorta / Great Vessels  -Aortic root dimension within normal limits.  -The Pulmonary artery is within normal limits. -IVC size is within normal limits with normal respiratory phasic changes.     M-Mode/2D Measurements & Calculations    LV Diastolic    LV Systolic Dimension: 4.6   AV Cusp Separation: 2.2 cmLA  Dimension: 5.8  cm                           Dimension: 5.3 cmAO Root  cm              LV Volume Diastolic: 180.2   Dimension: 3.6 cm  LV FS:20.7 %    ml  LV PW           LV Volume Systolic: 58.5 ml  Diastolic: 1 cm LV EDV/LV EDV Index: 145.5  Septum          ml/64 m^2LV ESV/LV ESV       RV Diastolic Dimension: 3.8  Diastolic: 1 cm Index: 60.7 ml/37 m^2        cm  EF Calculated: 42.1 %  LA/Aorta: 1.47  LV Length: 8.7 cm  LV Area  Diastolic: 15.3  cm^2  LV Area  Systolic: 16.1  cm^2    Doppler Measurements & Calculations    MV Peak E-Wave: 86.8 cm/s  AV Peak Velocity: 166  LVOT Peak Velocity: 116  MV Peak A-Wave: 95.8 cm/s  cm/s                   cm/s  MV E/A Ratio: 0.91         AV Peak Gradient:      LVOT Peak Gradient: 5  MV Peak Gradient: 3.01     11.02 mmHg             mmHg  mmHg    MV Deceleration Time: 229  msec TR Velocity:242 cm/s  AV P1/2t: 595 msec     TR Gradient:23.43 mmHg  PV Peak Velocity: 85.9  MV E' Septal Velocity: 5.4                        cm/s  cm/s                                              PV Peak Gradient: 2.95  MV A' Septal Velocity: 7.9 AV DVI (Vmax):0.7      mmHg  cm/s  MV E' Lateral Velocity:  6.5 cm/s  MV A' Lateral Velocity:  10.1 cm/s  E/E' septal: 16.07  E/E' lateral: 13.35  MR Velocity: 364 cm/s    [id number]      STRESS:    CATH:    Assessment/Plan       Diagnosis Orders   1. SOB (shortness of breath) on exertion  EKG 12 Lead      2. Coronary artery disease due to lipid rich plaque          CAD s/p CABG 2009  S/p PCI of LCX 6294  Diastolic CHF  HTN  HLD  DM  CANDY on CPAP    Denies any major symptoms at present  EKG is SR, NSST  BP 98/58 today  No lightheadedness, dizziness  Continue Aspirin, coreg, statin,   Continue lisinopril, lasix, terazosin  No major symptoms  The patient is asked to make an attempt to improve diet and exercise patterns to aid in medical management of this problem. Advised more plant based nutrition/meditarrean diet   Advised patient to call office or seek immediate medical attention if there is any new onset of  any chest pain, sob, palpitations, lightheadedness, dizziness, orthopnea, PND or pedal edema. All medication side effects were discussed in details. Thank youfor allowing me to participate in the care of this patient. Please do not hesitate to contact me for any further questions. Return in about 8 months (around 5/22/2023), or if symptoms worsen or fail to improve, for Review testing, Regular follow up.        Electronically signed by Latrice Concepcion MD [facility]  9/22/2022 at 1:27 PM EDT

## 2022-09-23 ENCOUNTER — TELEPHONE (OUTPATIENT)
Dept: CARDIOLOGY CLINIC | Age: 77
End: 2022-09-23

## 2022-09-26 RX ORDER — CARVEDILOL 12.5 MG/1
6.25 TABLET ORAL 2 TIMES DAILY
Qty: 90 TABLET | Refills: 1 | Status: SHIPPED | OUTPATIENT
Start: 2022-09-26 | End: 2022-09-28 | Stop reason: SDUPTHER

## 2022-09-28 RX ORDER — CARVEDILOL 6.25 MG/1
6.25 TABLET ORAL 2 TIMES DAILY
Qty: 180 TABLET | Refills: 3 | OUTPATIENT
Start: 2022-09-28

## 2022-12-13 ENCOUNTER — HOSPITAL ENCOUNTER (EMERGENCY)
Age: 77
Discharge: HOME | End: 2022-12-13
Payer: OTHER GOVERNMENT

## 2022-12-13 VITALS
BODY MASS INDEX: 37.89 KG/M2 | TEMPERATURE: 98.1 F | HEART RATE: 94 BPM | DIASTOLIC BLOOD PRESSURE: 85 MMHG | HEIGHT: 68 IN | OXYGEN SATURATION: 96 % | SYSTOLIC BLOOD PRESSURE: 132 MMHG | RESPIRATION RATE: 20 BRPM | WEIGHT: 250 LBS

## 2022-12-13 DIAGNOSIS — M10.9 GOUTY ARTHRITIS OF LEFT ANKLE: Primary | ICD-10-CM

## 2022-12-13 DIAGNOSIS — M10.9 GOUTY ARTHRITIS OF LEFT GREAT TOE: ICD-10-CM

## 2022-12-13 PROCEDURE — 99213 OFFICE O/P EST LOW 20 MIN: CPT

## 2022-12-13 PROCEDURE — 99213 OFFICE O/P EST LOW 20 MIN: CPT | Performed by: NURSE PRACTITIONER

## 2022-12-13 RX ORDER — METHYLPREDNISOLONE 4 MG/1
TABLET ORAL
Qty: 1 KIT | Refills: 0 | Status: SHIPPED | OUTPATIENT
Start: 2022-12-13 | End: 2022-12-19

## 2022-12-13 RX ORDER — COLCHICINE 0.6 MG/1
0.6 TABLET ORAL DAILY
Qty: 10 TABLET | Refills: 0 | Status: SHIPPED | OUTPATIENT
Start: 2022-12-13 | End: 2022-12-23

## 2022-12-13 ASSESSMENT — PAIN DESCRIPTION - PAIN TYPE: TYPE: ACUTE PAIN

## 2022-12-13 ASSESSMENT — PAIN DESCRIPTION - LOCATION: LOCATION: ANKLE

## 2022-12-13 ASSESSMENT — PAIN DESCRIPTION - ORIENTATION: ORIENTATION: LEFT

## 2022-12-13 ASSESSMENT — PAIN DESCRIPTION - ONSET: ONSET: GRADUAL

## 2022-12-13 ASSESSMENT — PAIN DESCRIPTION - DESCRIPTORS: DESCRIPTORS: ACHING;OTHER (COMMENT)

## 2022-12-13 ASSESSMENT — PAIN DESCRIPTION - FREQUENCY: FREQUENCY: CONTINUOUS

## 2022-12-13 ASSESSMENT — PAIN - FUNCTIONAL ASSESSMENT: PAIN_FUNCTIONAL_ASSESSMENT: 0-10

## 2022-12-13 ASSESSMENT — PAIN SCALES - GENERAL: PAINLEVEL_OUTOF10: 6

## 2022-12-13 ASSESSMENT — ENCOUNTER SYMPTOMS: SHORTNESS OF BREATH: 0

## 2022-12-13 NOTE — ED TRIAGE NOTES
Arrives to [facility] for the evaluation of left ankle pain and stiffness. Denies injury to the left ankle. Started having issues with the left ankle 2 days ago. Has been resting and elevating the left ankle. Rates pain 6/10 in severity. Is ambulatory but at this time is using a cane which is new. Mild swelling noticed to the left ankle. Has Hx of Gout and takes allopurinol and colchicine. Wife in room. Waiting provider to assess.

## 2022-12-13 NOTE — ED PROVIDER NOTES
[facility]  Urgent Care Encounter       CHIEF COMPLAINT       Chief Complaint   Patient presents with    Ankle Pain     \"Stiff\"- Left        Nurses Notes reviewed and I agree except as noted in the HPI. HISTORY OF PRESENT ILLNESS   Alberto Kennedy is a 65 y.o. male who presents with complaints of left ankle pain. This started 3 days ago. He has been elevating his foot at home. There has been no improvement. Does admit to a history of gout in the past.  Denies any known injury to his ankle. Does complain of pain in his left great toe and medial side of the foot. The history is provided by the patient and the spouse. REVIEW OF SYSTEMS     Review of Systems   Constitutional:  Negative for fever. Respiratory:  Negative for shortness of breath. Cardiovascular:  Positive for leg swelling. Negative for chest pain. Musculoskeletal:  Positive for arthralgias (left ankle), gait problem, joint swelling and myalgias (left foot). Skin:  Positive for rash (redness great toe). Negative for wound. Neurological:  Negative for weakness and numbness. PAST MEDICAL HISTORY         Diagnosis Date    Arthritis     CAD (coronary artery disease)     Chronic combined systolic and diastolic congestive heart failure (HCC)     Chronic kidney disease     Chronic kidney disease     Hyperlipidemia     Hypertension     Pneumonia     Stroke [facility])     2008       SURGICALHISTORY     Patient  has a past surgical history that includes Vein bypass surgery; Colonoscopy; Cardiac surgery; and vascular surgery.     CURRENT MEDICATIONS       Discharge Medication List as of 12/13/2022 10:34 AM        CONTINUE these medications which have NOT CHANGED    Details   carvedilol (COREG) 6.25 MG tablet Take 1 tablet by mouth 2 times daily, Disp-180 tablet, R-3Phone In      alogliptin (NESINA) 12.5 MG TABS tablet Take 12.5 mg by mouth dailyHistorical Med      fluorometholone (FML) 0.25 % ophthalmic suspension 1 drop 4 times dailyHistorical Med      vitamin D 25 MCG (1000 UT) CAPS Take by mouthHistorical Med      allopurinol (ZYLOPRIM) 100 MG tablet Take 100 mg by mouth dailyHistorical Med      atorvastatin (LIPITOR) 40 MG tablet Take 1 tablet by mouth daily, Disp-90 tablet, R-1Normal      lisinopril (PRINIVIL;ZESTRIL) 40 MG tablet Take 1 tablet by mouth daily, Disp-90 tablet, R-1Normal      aspirin 81 MG tablet Take 81 mg by mouth dailyHistorical Med      furosemide (LASIX) 20 MG tablet Take 1 tablet by mouth three times a week, Disp-60 tablet, R-3Normal      terazosin (HYTRIN) 2 MG capsule Take 2 mg by mouth nightlyHistorical Med             ALLERGIES     Patient is has No Known Allergies. Patients   There is no immunization history on file for this patient. FAMILY HISTORY     Patient's family history includes Asthma in his brother; Heart Disease in his father and mother; High Blood Pressure in his father. SOCIAL HISTORY     Patient  reports that he has never smoked. He has never used smokeless tobacco. He reports that he does not drink alcohol and does not use drugs. PHYSICAL EXAM     ED TRIAGE VITALS  BP: 132/85, Temp: 98.1 °F (36.7 °C), Heart Rate: 94, Resp: 20, SpO2: 96 %,Estimated body mass index is 38.01 kg/m² as calculated from the following:    Height as of this encounter: 5' 8\" (1.727 m). Weight as of this encounter: 250 lb (113.4 kg). ,No LMP for male patient. Physical Exam  Vitals and nursing note reviewed. Constitutional:       General: He is not in acute distress. Cardiovascular:      Rate and Rhythm: Normal rate. Pulses: Normal pulses. Pulmonary:      Effort: Pulmonary effort is normal.   Musculoskeletal:         General: Swelling and tenderness present. Left ankle: Swelling present. Tenderness present over the medial malleolus. Decreased range of motion. Feet:    Skin:     General: Skin is warm and dry. Findings: Erythema (mild left great toe) present. No bruising. Neurological:      Mental Status: He is alert and oriented to person, place, and time. Motor: No weakness. Gait: Gait abnormal.       DIAGNOSTIC RESULTS     Labs:No results found for this visit on 12/13/22. IMAGING:  None    EKG:  None    URGENT CARE COURSE:     Vitals:    12/13/22 1006   BP: 132/85   Pulse: 94   Resp: 20   Temp: 98.1 °F (36.7 °C)   TempSrc: Temporal   SpO2: 96%   Weight: 250 lb (113.4 kg)   Height: 5' 8\" (1.727 m)       Medications - No data to display       PROCEDURES:  None    FINAL IMPRESSION      1. Gouty arthritis of left ankle    2. Gouty arthritis of left great toe      DISPOSITION/ PLAN   DISPOSITION Decision To Discharge 12/13/2022 10:26:14 AM     Exam consistent with gouty arthritis of the left ankle and left great toe. No need for x-rays at this time. We will start patient on Colcrys. In addition, patient will be prescribed Medrol Dosepak to help with arthritis. Advised to continue elevating his legs. Follow-up with PCP if does not improve in the next 3 days.     PATIENT REFERRED TO:  Mavis Gudino MD  [address] [facility] / [place] [address]      DISCHARGE MEDICATIONS:  Discharge Medication List as of 12/13/2022 10:34 AM        START taking these medications    Details   colchicine (COLCRYS) 0.6 MG tablet Take 1 tablet by mouth daily for 10 days, Disp-10 tablet, R-0Normal      methylPREDNISolone (MEDROL, NORA,) 4 MG tablet Take by mouth., Disp-1 kit, R-0Normal             Discharge Medication List as of 12/13/2022 10:34 AM          Discharge Medication List as of 12/13/2022 10:34 AM          Derald Bloch, APRN - CNP    (Please note that portions of this note were completed with a voice recognition program. Efforts were made to edit the dictations but occasionally words are mis-transcribed.)           Derald Bloch, APRN - CNP  12/13/22 4547

## 2023-06-20 ENCOUNTER — TELEPHONE (OUTPATIENT)
Dept: CARDIOLOGY CLINIC | Age: 78
End: 2023-06-20

## 2023-06-22 ENCOUNTER — OFFICE VISIT (OUTPATIENT)
Dept: CARDIOLOGY CLINIC | Age: 78
End: 2023-06-22
Payer: OTHER GOVERNMENT

## 2023-06-22 VITALS
SYSTOLIC BLOOD PRESSURE: 142 MMHG | BODY MASS INDEX: 39.31 KG/M2 | HEART RATE: 76 BPM | HEIGHT: 68 IN | WEIGHT: 259.4 LBS | DIASTOLIC BLOOD PRESSURE: 64 MMHG

## 2023-06-22 DIAGNOSIS — I25.10 CORONARY ARTERY DISEASE DUE TO LIPID RICH PLAQUE: Primary | ICD-10-CM

## 2023-06-22 DIAGNOSIS — I25.83 CORONARY ARTERY DISEASE DUE TO LIPID RICH PLAQUE: Primary | ICD-10-CM

## 2023-06-22 PROCEDURE — 3078F DIAST BP <80 MM HG: CPT | Performed by: INTERNAL MEDICINE

## 2023-06-22 PROCEDURE — 1123F ACP DISCUSS/DSCN MKR DOCD: CPT | Performed by: INTERNAL MEDICINE

## 2023-06-22 PROCEDURE — 3077F SYST BP >= 140 MM HG: CPT | Performed by: INTERNAL MEDICINE

## 2023-06-22 PROCEDURE — 99214 OFFICE O/P EST MOD 30 MIN: CPT | Performed by: INTERNAL MEDICINE

## 2023-06-22 NOTE — PROGRESS NOTES
Past Medical History:   Diagnosis Date    Heart palpitations     MTHFR mutation     Stroke 2015     Past Surgical History:   Procedure Laterality Date    BELT ABDOMINOPLASTY          CO TRANSCRAN DOPP INTRACRAN, EMBOLI W/INJ  2015         WISDOM TOOTH EXTRACTION       Family History   Problem Relation Age of Onset    Breast cancer Maternal Grandmother 70    Stroke Maternal Grandmother     Birth defects Maternal Grandmother      Breast Cancer    Birth defects Paternal Grandfather 80     Colon/Prostate Ca?    Cancer Neg Hx     Colon cancer Neg Hx     Diabetes Neg Hx     Eclampsia Neg Hx     Hypertension Neg Hx     Miscarriages / Stillbirths Neg Hx     Ovarian cancer Neg Hx      labor Neg Hx      Social History   Substance Use Topics    Smoking status: Light Tobacco Smoker     Packs/day: 0.50     Years: 8.00     Last attempt to quit: 2015    Smokeless tobacco: Never Used      Comment: quit 2015    Alcohol use 0.0 oz/week      Comment: Social     Review of patient's allergies indicates:  No Known Allergies  Medications: I have reviewed the current medication administration record.      (Not in a hospital admission)    Review of Systems   Constitutional: Negative for chills and fever.   HENT: Negative for drooling.    Eyes: Positive for photophobia and visual disturbance.   Respiratory: Negative for cough and shortness of breath.    Cardiovascular: Negative for chest pain and palpitations.   Gastrointestinal: Negative for nausea and vomiting.   Skin: Negative for rash.   Neurological: Positive for dizziness, facial asymmetry (baseline), weakness (baseline R sided deficits), numbness (lips) and headaches. Negative for speech difficulty.   Psychiatric/Behavioral: Negative for agitation.     Objective:     Vital Signs (Most Recent):  Temp: 98.7 °F (37.1 °C) (17)  Pulse: 93 (17)  Resp: 16 (17)  BP: 117/62 (17)  SpO2: 100 %  Patient presents for a 9-month follow-up. He denies chest pain, dizziness, and swelling in lower extremities. He gets short of breath upon walking a long duration. He can also feel heart palpitations occasionally. ([id number])    Vital Signs Range (Last 24H):  Temp:  [98.7 °F (37.1 °C)-99.6 °F (37.6 °C)]   Pulse:  [93-98]   Resp:  [16]   BP: (117-127)/(62-77)   SpO2:  [99 %-100 %]     Physical Exam   Constitutional: She is oriented to person, place, and time. She appears well-developed and well-nourished. No distress.   HENT:   Head: Normocephalic and atraumatic.   Eyes: EOM are normal. Pupils are equal, round, and reactive to light.   Cardiovascular: Normal rate.    Pulmonary/Chest: Effort normal. No respiratory distress.   Abdominal: Soft.   Neurological: She is alert and oriented to person, place, and time.   Skin: Skin is warm and dry.   Vitals reviewed.      Neurological Exam:   LOC: alert and follows requests  Language: No aphasia  Speech: No dysarthria  Orientation: Person, Place, Time  Memory: Recent memory intact, Remote memory intact, Age correct, Month correct  Visual Fields (CN II): Full  EOM (CN III, IV, VI): Full/intact  Pupils (CN III, IV, VI): PERRL  Facial Sensation (CN V): Symmetric  Facial Movement (CN VII): lower weakness right lower  Motor*: Arm Left:  Normal (5/5), Leg Left:   Normal (5/5), Arm Right:   Paretic:  3/5, Leg Right:   Normal (5/5)  Sensation: intact to light touch, temperature and vibration  Tone: Arm-Left: normal; Leg-Left: normal; Arm-Right: spastic; Leg-Right: normal    NIH Stroke Scale:  Interval: baseline (upon arrival/admit)  Level of Consciousness: 0 - alert  LOC Questions: 0 - answers both correctly  LOC Commands: 0 - performs both correctly  Best Gaze: 0 - normal  Visual: 0 - no visual loss  Facial Palsy: 2 - partial (baseline)  Motor Left Arm: 0 - no drift  Motor Right Arm: 1 - drift (baseline)  Motor Left Le - no drift  Motor Right Le - no drift  Limb Ataxia: 0 - absent  Sensory: 0 - normal  Best Language: 0 - no aphasia  Dysarthria: 0 - normal articulation  Extinction and Inattention: 0 - no neglect  NIH Stroke Scale Total: 3  Modified [place] Scale:   Timeline: Prior  to symptoms onset  Modified [place] Score: 2 - slight disability        Laboratory:  CMP:   Recent Labs  Lab 09/29/17  1546   CALCIUM 9.4   ALBUMIN 3.7   PROT 7.4      K 3.6   CO2 26      BUN 10   CREATININE 0.7   ALKPHOS 77   ALT 15   AST 17   BILITOT 0.4     CBC:   Recent Labs  Lab 09/29/17  1546   WBC 10.01   RBC 3.97*   HGB 12.6   HCT 38.0      MCV 96   MCH 31.7*   MCHC 33.2     Lipid Panel: No results for input(s): CHOL, LDLCALC, HDL, TRIG in the last 168 hours.  Coagulation: No results for input(s): INR, APTT in the last 168 hours.    Invalid input(s): PT  Platelet Aggregation Study: No results for input(s): PLTAGG, PLTAGINTERP, PLTAGREGLACO, ADPPLTAGGREG in the last 168 hours.  Hgb A1C: No results for input(s): HGBA1C in the last 168 hours.  TSH:   Recent Labs  Lab 09/29/17  1546   TSH 0.277*       Diagnostic Results:      CTA Head and Neck. Date: 09/29/17  CT angiogram of the head and neck demonstrates no significant vascular abnormality.    Noncontrast and contrast-enhanced CT head demonstrated no acute intracranial abnormalities.    Remote left basal ganglia infarct/hemorrhage

## 2023-06-22 NOTE — PROGRESS NOTES
[address] [facility] [address] [place] [address]  Dept: [phone]  Dept Fax: [phone]  Loc: [phone]    Visit Date: 6/22/2023    Mr. Ilia Amos is a 77 y.o. male  who presented for:  Chief Complaint   Patient presents with    Follow-up     9-month follow-up       HPI:   76 yo M c hx of CAD s/p CABG in 2009, PCI of LCx in 2019, HTN, HLD, Stroke, DM, CANDY on CPAP is here to establish cardiology. Patient used to follow up at [facility] and now wants to follow up here due to long driving distance. He also had diastolic dysfunction. Present with wife. He currently is euvolemic. Denies any chest pain, sob, palpitations, lightheadedness, dizziness, orthopnea, PND or pedal edema.            Current Outpatient Medications:     carvedilol (COREG) 6.25 MG tablet, Take 1 tablet by mouth 2 times daily, Disp: 180 tablet, Rfl: 3    alogliptin (NESINA) 12.5 MG TABS tablet, Take 1 tablet by mouth daily, Disp: , Rfl:     fluorometholone (FML) 0.25 % ophthalmic suspension, 1 drop 4 times daily, Disp: , Rfl:     vitamin D 25 MCG (1000 UT) CAPS, Take by mouth, Disp: , Rfl:     allopurinol (ZYLOPRIM) 100 MG tablet, Take 1 tablet by mouth daily, Disp: , Rfl:     atorvastatin (LIPITOR) 40 MG tablet, Take 1 tablet by mouth daily, Disp: 90 tablet, Rfl: 1    lisinopril (PRINIVIL;ZESTRIL) 40 MG tablet, Take 1 tablet by mouth daily, Disp: 90 tablet, Rfl: 1    aspirin 81 MG tablet, Take 1 tablet by mouth daily, Disp: , Rfl:     furosemide (LASIX) 20 MG tablet, Take 1 tablet by mouth three times a week, Disp: 60 tablet, Rfl: 3    terazosin (HYTRIN) 2 MG capsule, Take 1 capsule by mouth nightly, Disp: , Rfl:     Past Medical History  Claudeen Meadows  has a past medical history of Arthritis, CAD (coronary artery disease), Chronic combined systolic and diastolic congestive heart failure ([facility] [address] Chronic kidney disease, Chronic kidney disease, Hyperlipidemia, Hypertension,

## 2024-04-24 RX ORDER — ATORVASTATIN CALCIUM 40 MG/1
40 TABLET, FILM COATED ORAL DAILY
Qty: 90 TABLET | Refills: 1 | Status: SHIPPED | OUTPATIENT
Start: 2024-04-24

## 2024-04-24 RX ORDER — LISINOPRIL 40 MG/1
40 TABLET ORAL DAILY
Qty: 90 TABLET | Refills: 1 | Status: SHIPPED | OUTPATIENT
Start: 2024-04-24

## 2024-04-24 NOTE — TELEPHONE ENCOUNTER
Carlos Chand called requesting a refill on the following medications:  Requested Prescriptions     Pending Prescriptions Disp Refills    lisinopril (PRINIVIL;ZESTRIL) 40 MG tablet 90 tablet 1     Sig: Take 1 tablet by mouth daily    atorvastatin (LIPITOR) 40 MG tablet 90 tablet 1     Sig: Take 1 tablet by mouth daily     [facility]      Date of last visit: 6/22/2023    Date of next visit (if applicable): 6/13/2024

## 2024-04-29 ENCOUNTER — TELEPHONE (OUTPATIENT)
Dept: CARDIOLOGY CLINIC | Age: 79
End: 2024-04-29

## 2024-04-29 NOTE — TELEPHONE ENCOUNTER
Pts wife calling, pt has a bottle of Spironolactone 25 mg, take 1/2 tab daily. Original Rx was from Dr Dior at [facility]. They are asking for refill. Not on medlist. Okay to refill?    Rx to [facility].

## 2024-04-30 ENCOUNTER — TELEPHONE (OUTPATIENT)
Dept: CARDIOLOGY CLINIC | Age: 79
End: 2024-04-30

## 2024-04-30 NOTE — TELEPHONE ENCOUNTER
Pt stopped at the  wanting a refill of spirolactone 25 mg 0.5 tab daily sent to PCP office fax# [phone]    Not on med list, but states he has been taking this for awhile     Dr Pickard ok to fax script?

## 2024-05-01 RX ORDER — SPIRONOLACTONE 25 MG/1
12.5 TABLET ORAL DAILY
COMMUNITY
End: 2024-05-01 | Stop reason: SDUPTHER

## 2024-05-01 NOTE — TELEPHONE ENCOUNTER
Spoke with Charo - pts spouse on HIPAA.  Charo voiced understanding.  Med list updated.  Med pended in a separate encounter.

## 2024-05-02 RX ORDER — SPIRONOLACTONE 25 MG/1
12.5 TABLET ORAL DAILY
Qty: 45 TABLET | Refills: 0 | Status: SHIPPED | OUTPATIENT
Start: 2024-05-02

## 2024-06-05 ENCOUNTER — OFFICE VISIT (OUTPATIENT)
Dept: CARDIOLOGY CLINIC | Age: 79
End: 2024-06-05
Payer: MEDICARE

## 2024-06-05 VITALS
DIASTOLIC BLOOD PRESSURE: 72 MMHG | HEART RATE: 77 BPM | HEIGHT: 68 IN | BODY MASS INDEX: 40.56 KG/M2 | WEIGHT: 267.6 LBS | SYSTOLIC BLOOD PRESSURE: 118 MMHG

## 2024-06-05 DIAGNOSIS — I50.42 CHRONIC COMBINED SYSTOLIC AND DIASTOLIC CONGESTIVE HEART FAILURE (HCC): ICD-10-CM

## 2024-06-05 DIAGNOSIS — I25.5 ISCHEMIC CARDIOMYOPATHY: ICD-10-CM

## 2024-06-05 DIAGNOSIS — I25.10 CORONARY ARTERY DISEASE INVOLVING NATIVE CORONARY ARTERY OF NATIVE HEART WITHOUT ANGINA PECTORIS: Primary | ICD-10-CM

## 2024-06-05 DIAGNOSIS — I10 PRIMARY HYPERTENSION: ICD-10-CM

## 2024-06-05 PROCEDURE — 93000 ELECTROCARDIOGRAM COMPLETE: CPT | Performed by: STUDENT IN AN ORGANIZED HEALTH CARE EDUCATION/TRAINING PROGRAM

## 2024-06-05 PROCEDURE — 1123F ACP DISCUSS/DSCN MKR DOCD: CPT | Performed by: STUDENT IN AN ORGANIZED HEALTH CARE EDUCATION/TRAINING PROGRAM

## 2024-06-05 PROCEDURE — 3074F SYST BP LT 130 MM HG: CPT | Performed by: STUDENT IN AN ORGANIZED HEALTH CARE EDUCATION/TRAINING PROGRAM

## 2024-06-05 PROCEDURE — 99214 OFFICE O/P EST MOD 30 MIN: CPT | Performed by: STUDENT IN AN ORGANIZED HEALTH CARE EDUCATION/TRAINING PROGRAM

## 2024-06-05 PROCEDURE — G8417 CALC BMI ABV UP PARAM F/U: HCPCS | Performed by: STUDENT IN AN ORGANIZED HEALTH CARE EDUCATION/TRAINING PROGRAM

## 2024-06-05 PROCEDURE — G8427 DOCREV CUR MEDS BY ELIG CLIN: HCPCS | Performed by: STUDENT IN AN ORGANIZED HEALTH CARE EDUCATION/TRAINING PROGRAM

## 2024-06-05 PROCEDURE — 1036F TOBACCO NON-USER: CPT | Performed by: STUDENT IN AN ORGANIZED HEALTH CARE EDUCATION/TRAINING PROGRAM

## 2024-06-05 PROCEDURE — 3078F DIAST BP <80 MM HG: CPT | Performed by: STUDENT IN AN ORGANIZED HEALTH CARE EDUCATION/TRAINING PROGRAM

## 2024-06-05 NOTE — PROGRESS NOTES
1 year follow up.  Denies chest pain, palpitations, dizziness, shortness of breath, and edema.  
Other       Assessment/Plan:   Hx of CAD- stable with this symptoms. No angina. No workup in a while. Agreeable to check echo for further evaluation. Cont GDMT as before.   CHF-stable. No new symptoms. Echo as above. Cont coreg, lasix, ace, aldactone.   HTN-well controlled today. No changes to meds.     Disposition:   Echo. 1 year.   Follow up with Dr Pickard as scheduled or sooner if needed

## 2024-06-13 RX ORDER — SPIRONOLACTONE 25 MG/1
12.5 TABLET ORAL DAILY
Qty: 45 TABLET | Refills: 3 | Status: SHIPPED | OUTPATIENT
Start: 2024-06-13

## 2024-06-13 RX ORDER — FUROSEMIDE 20 MG/1
20 TABLET ORAL
Qty: 90 TABLET | Refills: 3 | Status: SHIPPED | OUTPATIENT
Start: 2024-06-14

## 2024-06-13 RX ORDER — ATORVASTATIN CALCIUM 40 MG/1
40 TABLET, FILM COATED ORAL DAILY
Qty: 90 TABLET | Refills: 3 | Status: SHIPPED | OUTPATIENT
Start: 2024-06-13

## 2024-06-18 ENCOUNTER — HOSPITAL ENCOUNTER (OUTPATIENT)
Age: 79
Discharge: HOME | End: 2024-06-20
Payer: MEDICARE

## 2024-06-18 VITALS
HEIGHT: 68 IN | DIASTOLIC BLOOD PRESSURE: 72 MMHG | BODY MASS INDEX: 40.47 KG/M2 | WEIGHT: 267 LBS | SYSTOLIC BLOOD PRESSURE: 118 MMHG

## 2024-06-18 DIAGNOSIS — I25.5 ISCHEMIC CARDIOMYOPATHY: ICD-10-CM

## 2024-06-18 DIAGNOSIS — I50.42 CHRONIC COMBINED SYSTOLIC AND DIASTOLIC CONGESTIVE HEART FAILURE (HCC): ICD-10-CM

## 2024-06-18 LAB
ECHO AO ASC DIAM: 3.6 CM
ECHO AO ASCENDING AORTA INDEX: 1.56 CM/M2
ECHO AV CUSP MM: 1.8 CM
ECHO AV PEAK GRADIENT: 11 MMHG
ECHO AV PEAK VELOCITY: 1.6 M/S
ECHO AV VELOCITY RATIO: 0.56
ECHO BSA: 2.41 M2
ECHO LA AREA 2C: 16.2 CM2
ECHO LA AREA 4C: 15.9 CM2
ECHO LA DIAMETER INDEX: 2.03 CM/M2
ECHO LA DIAMETER: 4.7 CM
ECHO LA MAJOR AXIS: 4.9 CM
ECHO LA MINOR AXIS: 4.6 CM
ECHO LA VOL BP: 45 ML (ref 18–58)
ECHO LA VOL MOD A2C: 45 ML (ref 18–58)
ECHO LA VOL MOD A4C: 42 ML (ref 18–58)
ECHO LA VOL/BSA BIPLANE: 19 ML/M2 (ref 16–34)
ECHO LA VOLUME INDEX MOD A2C: 19 ML/M2 (ref 16–34)
ECHO LA VOLUME INDEX MOD A4C: 18 ML/M2 (ref 16–34)
ECHO LV E' LATERAL VELOCITY: 10 CM/S
ECHO LV E' SEPTAL VELOCITY: 9 CM/S
ECHO LV FRACTIONAL SHORTENING: 27 % (ref 28–44)
ECHO LV INTERNAL DIMENSION DIASTOLE INDEX: 2.25 CM/M2
ECHO LV INTERNAL DIMENSION DIASTOLIC: 5.2 CM (ref 4.2–5.9)
ECHO LV INTERNAL DIMENSION SYSTOLIC INDEX: 1.65 CM/M2
ECHO LV INTERNAL DIMENSION SYSTOLIC: 3.8 CM
ECHO LV ISOVOLUMETRIC RELAXATION TIME (IVRT): 74 MS
ECHO LV IVSD: 1.1 CM (ref 0.6–1)
ECHO LV MASS 2D: 194.2 G (ref 88–224)
ECHO LV MASS INDEX 2D: 84.1 G/M2 (ref 49–115)
ECHO LV POSTERIOR WALL DIASTOLIC: 0.9 CM (ref 0.6–1)
ECHO LV RELATIVE WALL THICKNESS RATIO: 0.35
ECHO LVOT PEAK GRADIENT: 3 MMHG
ECHO LVOT PEAK VELOCITY: 0.9 M/S
ECHO MV A VELOCITY: 0.95 M/S
ECHO MV E DECELERATION TIME (DT): 176 MS
ECHO MV E VELOCITY: 0.72 M/S
ECHO MV E/A RATIO: 0.76
ECHO MV E/E' LATERAL: 7.2
ECHO MV E/E' RATIO (AVERAGED): 7.6
ECHO MV E/E' SEPTAL: 8
ECHO PV MAX VELOCITY: 0.5 M/S
ECHO PV PEAK GRADIENT: 1 MMHG
ECHO RV INTERNAL DIMENSION: 5 CM
ECHO RV TAPSE: 1.7 CM (ref 1.7–?)
ECHO TV E WAVE: 0.8 M/S
ECHO TV REGURGITANT MAX VELOCITY: 2.8 M/S
ECHO TV REGURGITANT PEAK GRADIENT: 31 MMHG

## 2024-06-18 PROCEDURE — 93306 TTE W/DOPPLER COMPLETE: CPT

## 2024-06-19 ENCOUNTER — TELEPHONE (OUTPATIENT)
Dept: CARDIOLOGY CLINIC | Age: 79
End: 2024-06-19

## 2024-06-19 NOTE — TELEPHONE ENCOUNTER
----- Message from Connor Mortimer, PA-C sent at 6/19/2024  7:41 AM EDT -----  No significant concerns based on echo. LV function actually mild improvement from previous echo. No changes to plans.

## 2024-11-25 RX ORDER — LISINOPRIL 40 MG/1
40 TABLET ORAL DAILY
Qty: 90 TABLET | Refills: 1 | Status: SHIPPED | OUTPATIENT
Start: 2024-11-25

## 2024-11-25 NOTE — TELEPHONE ENCOUNTER
Carlos Chand called requesting a refill on the following medications:  Requested Prescriptions     Pending Prescriptions Disp Refills    lisinopril (PRINIVIL;ZESTRIL) 40 MG tablet 90 tablet 1     Sig: Take 1 tablet by mouth daily     [facility]      Date of last visit: 6/5/24  Date of next visit (if applicable):5/29/25

## 2025-02-02 ENCOUNTER — APPOINTMENT (OUTPATIENT)
Dept: CT IMAGING | Age: 80
End: 2025-02-02
Payer: OTHER GOVERNMENT

## 2025-02-02 ENCOUNTER — HOSPITAL ENCOUNTER (EMERGENCY)
Age: 80
Discharge: HOME | End: 2025-02-02
Payer: OTHER GOVERNMENT

## 2025-02-02 VITALS
OXYGEN SATURATION: 96 % | RESPIRATION RATE: 16 BRPM | WEIGHT: 250 LBS | HEART RATE: 85 BPM | TEMPERATURE: 97 F | DIASTOLIC BLOOD PRESSURE: 76 MMHG | BODY MASS INDEX: 37.89 KG/M2 | HEIGHT: 68 IN | SYSTOLIC BLOOD PRESSURE: 142 MMHG

## 2025-02-02 DIAGNOSIS — K59.00 CONSTIPATION, UNSPECIFIED CONSTIPATION TYPE: ICD-10-CM

## 2025-02-02 DIAGNOSIS — R10.32 LEFT LOWER QUADRANT ABDOMINAL PAIN: Primary | ICD-10-CM

## 2025-02-02 LAB
ALBUMIN SERPL BCG-MCNC: 4 G/DL (ref 3.5–5.1)
ALP SERPL-CCNC: 77 U/L (ref 38–126)
ALT SERPL W/O P-5'-P-CCNC: 18 U/L (ref 11–66)
ANION GAP SERPL CALC-SCNC: 14 MEQ/L (ref 8–16)
AST SERPL-CCNC: 15 U/L (ref 5–40)
BASOPHILS ABSOLUTE: 0.1 THOU/MM3 (ref 0–0.1)
BASOPHILS NFR BLD AUTO: 0.6 %
BILIRUB SERPL-MCNC: 0.6 MG/DL (ref 0.3–1.2)
BUN SERPL-MCNC: 26 MG/DL (ref 7–22)
CALCIUM SERPL-MCNC: 9.2 MG/DL (ref 8.5–10.5)
CHLORIDE SERPL-SCNC: 101 MEQ/L (ref 98–111)
CO2 SERPL-SCNC: 23 MEQ/L (ref 23–33)
CREAT SERPL-MCNC: 1.2 MG/DL (ref 0.4–1.2)
DEPRECATED RDW RBC AUTO: 47.8 FL (ref 35–45)
EOSINOPHIL NFR BLD AUTO: 3 %
EOSINOPHILS ABSOLUTE: 0.4 THOU/MM3 (ref 0–0.4)
ERYTHROCYTE [DISTWIDTH] IN BLOOD BY AUTOMATED COUNT: 13.4 % (ref 11.5–14.5)
GFR SERPL CREATININE-BSD FRML MDRD: 61 ML/MIN/1.73M2
GLUCOSE SERPL-MCNC: 119 MG/DL (ref 70–108)
HCT VFR BLD AUTO: 42.3 % (ref 42–52)
HGB BLD-MCNC: 13.8 GM/DL (ref 14–18)
IMM GRANULOCYTES # BLD AUTO: 0.06 THOU/MM3 (ref 0–0.07)
IMM GRANULOCYTES NFR BLD AUTO: 0.5 %
LACTATE SERPL-SCNC: 1.3 MMOL/L (ref 0.5–2)
LIPASE SERPL-CCNC: 21.6 U/L (ref 5.6–51.3)
LYMPHOCYTES ABSOLUTE: 2.5 THOU/MM3 (ref 1–4.8)
LYMPHOCYTES NFR BLD AUTO: 19.6 %
MCH RBC QN AUTO: 31.4 PG (ref 26–33)
MCHC RBC AUTO-ENTMCNC: 32.6 GM/DL (ref 32.2–35.5)
MCV RBC AUTO: 96.4 FL (ref 80–94)
MONOCYTES ABSOLUTE: 1.2 THOU/MM3 (ref 0.4–1.3)
MONOCYTES NFR BLD AUTO: 9.2 %
NEUTROPHILS ABSOLUTE: 8.5 THOU/MM3 (ref 1.8–7.7)
NEUTROPHILS NFR BLD AUTO: 67.1 %
NRBC BLD AUTO-RTO: 0 /100 WBC
OSMOLALITY SERPL CALC.SUM OF ELEC: 281.6 MOSMOL/KG (ref 275–300)
PLATELET # BLD AUTO: 192 THOU/MM3 (ref 130–400)
PMV BLD AUTO: 10.4 FL (ref 9.4–12.4)
POTASSIUM SERPL-SCNC: 4.3 MEQ/L (ref 3.5–5.2)
PROT SERPL-MCNC: 6.8 G/DL (ref 6.1–8)
RBC # BLD AUTO: 4.39 MILL/MM3 (ref 4.7–6.1)
SODIUM SERPL-SCNC: 138 MEQ/L (ref 135–145)
WBC # BLD AUTO: 12.6 THOU/MM3 (ref 4.8–10.8)

## 2025-02-02 PROCEDURE — 99214 OFFICE O/P EST MOD 30 MIN: CPT

## 2025-02-02 PROCEDURE — 83690 ASSAY OF LIPASE: CPT

## 2025-02-02 PROCEDURE — 80053 COMPREHEN METABOLIC PANEL: CPT

## 2025-02-02 PROCEDURE — 99215 OFFICE O/P EST HI 40 MIN: CPT

## 2025-02-02 PROCEDURE — 99285 EMERGENCY DEPT VISIT HI MDM: CPT

## 2025-02-02 PROCEDURE — 85025 COMPLETE CBC W/AUTO DIFF WBC: CPT

## 2025-02-02 PROCEDURE — 83605 ASSAY OF LACTIC ACID: CPT

## 2025-02-02 PROCEDURE — 6360000004 HC RX CONTRAST MEDICATION

## 2025-02-02 PROCEDURE — 74177 CT ABD & PELVIS W/CONTRAST: CPT

## 2025-02-02 PROCEDURE — 36415 COLL VENOUS BLD VENIPUNCTURE: CPT

## 2025-02-02 RX ORDER — IOPAMIDOL 755 MG/ML
80 INJECTION, SOLUTION INTRAVASCULAR
Status: COMPLETED | OUTPATIENT
Start: 2025-02-02 | End: 2025-02-02

## 2025-02-02 RX ADMIN — IOPAMIDOL 80 ML: 755 INJECTION, SOLUTION INTRAVENOUS at 14:41

## 2025-02-02 ASSESSMENT — PAIN DESCRIPTION - ORIENTATION: ORIENTATION: LEFT;LOWER

## 2025-02-02 ASSESSMENT — PAIN DESCRIPTION - ONSET: ONSET: ON-GOING

## 2025-02-02 ASSESSMENT — PAIN DESCRIPTION - DESCRIPTORS: DESCRIPTORS: ACHING;PRESSURE

## 2025-02-02 ASSESSMENT — PAIN DESCRIPTION - FREQUENCY: FREQUENCY: INTERMITTENT

## 2025-02-02 ASSESSMENT — PAIN DESCRIPTION - LOCATION: LOCATION: ABDOMEN

## 2025-02-02 ASSESSMENT — PAIN SCALES - GENERAL: PAINLEVEL_OUTOF10: 5

## 2025-02-02 ASSESSMENT — PAIN - FUNCTIONAL ASSESSMENT: PAIN_FUNCTIONAL_ASSESSMENT: 0-10

## 2025-02-02 ASSESSMENT — PAIN DESCRIPTION - PAIN TYPE: TYPE: ACUTE PAIN

## 2025-02-02 NOTE — ED PROVIDER NOTES
[facility] URGENT CARE  Urgent Care Encounter       CHIEF COMPLAINT       Chief Complaint   Patient presents with    Abdominal Pain     LLQ     Constipation     Last BM was Wed 1/29/25       Nurses Notes reviewed and I agree except as noted in the HPI.  HISTORY OF PRESENT ILLNESS   Carlos Chand is a 80 y.o. male who presents to [facility] urgent care for evaluation of LLQ abdominal pain and constipation.  Pt reporting a history of diverticulitis in which he \"Typically\" doesn't have a lot of issues with.  Pt reporting that his last bowel movement was Tuesday and it \"Was not normal and was very small dong.\"  Pt reporting significant pain to the LLQ made worse with ambulation, palpation, and sitting.  Pt reports that pain is better when he leans back.  [facility].  Pt denies fevers.  Denies any N/V.  Pt reporting that he has been following a proper diet since being told about the Diverticulosis.      The history is provided by the patient and the spouse. No  was used.       REVIEW OF SYSTEMS     Review of Systems   Constitutional:  Negative for chills, fatigue and fever.   HENT:  Negative for congestion, ear pain and sore throat.    Eyes:  Negative for discharge.   Respiratory:  Negative for cough, chest tightness, shortness of breath and wheezing.    Cardiovascular:  Negative for chest pain.   Gastrointestinal:  Positive for abdominal pain and constipation. Negative for diarrhea, nausea and vomiting.   Genitourinary:  Negative for difficulty urinating.   Neurological:  Negative for dizziness and numbness.   Psychiatric/Behavioral:  Negative for suicidal ideas.    All other systems reviewed and are negative.      PAST MEDICAL HISTORY         Diagnosis Date    Arthritis     CAD (coronary artery disease)     Chronic combined systolic and diastolic congestive heart failure (HCC)     Chronic kidney disease     Chronic kidney disease     Hyperlipidemia

## 2025-02-02 NOTE — ED TRIAGE NOTES
Pt to room 17 from urgent care. Rprts LLQ discomfort for the past week. Rprts last bowel movement two days ago. Denies pain at this time. Last meal yesterday afternoon. Pt A&O x4. Breathing easy and unlabored on RA. Vitals updated. Call light within reach.

## 2025-02-02 NOTE — ED PROVIDER NOTES
[facility] EMERGENCY DEPARTMENT      EMERGENCY MEDICINE     Pt Name: Carlos Chand  MRN: [id number]  Birthdate 1945  Date of evaluation: 2/2/2025  Provider: Frannie Gaona PA-C    CHIEF COMPLAINT       Chief Complaint   Patient presents with    Abdominal Pain     LLQ     Constipation     Last BM was Wed 1/29/25     HISTORY OF PRESENT ILLNESS   Carlos Chand is a pleasant 80 y.o. male who presents to the emergency department from from home, by private vehicle for evaluation of LLQ abdominal pain.   Patient was seen at urgent care and sent to emergency department. Patient states he has had this left lower quadrant pain for about 1 week, that he feels is worse with movement.  Patient also states he has not had a bowel movement in over 2 days, and 2 days ago the bowel movement was rather hard and small.  Patient states he is still having flatulence, denies melena or hematochezia.  Patient denies fever, nausea, vomiting, chest pain, shortness of breath, lightheadedness, urinary symptoms.    PASTMEDICAL HISTORY     Past Medical History:   Diagnosis Date    Arthritis     CAD (coronary artery disease)     Chronic combined systolic and diastolic congestive heart failure (HCC)     Chronic kidney disease     Chronic kidney disease     Hyperlipidemia     Hypertension     Pneumonia     Stroke (HCC)     2008       Patient Active Problem List   Diagnosis Code    Hypoxia R09.02    Pneumonia J18.9    Chest pain due to myocardial ischemia I25.9    Hypertension I10    Chronic kidney disease N18.9    Hyperlipidemia E78.5    Hyperglycemia R73.9    Chest pain R07.9    Coronary artery disease involving native coronary artery of native heart I25.10    Ischemic cardiomyopathy I25.5    Chronic combined systolic and diastolic congestive heart failure (HCC) I50.42    Right carotid bruit R09.89     SURGICAL HISTORY       Past Surgical History:   Procedure Laterality Date    CARDIAC SURGERY      COLONOSCOPY      VASCULAR SURGERY

## 2025-02-02 NOTE — DISCHARGE INSTRUCTIONS
Take Miralax (Polyethylene glycol) per over the counter instructions until regular soft bowel movements, then decrease to every other day.   Take Colace (docusate) per over the counter instructions.     Ensure you are drinking plenty of noncaffeinated fluids and high-fiber foods.    If you start to have loose stools stop the MiraLAX.    Follow-up with primary care this week.    Return to the emergency department for any uncontrolled pain, high fever, inability to have bowel movement, or any other care or concern.

## 2025-02-02 NOTE — ED TRIAGE NOTES
Arrives to [facility] for the evaluation of intermittent LLQ abdominal pain for approx 1 week.  At this time pain is rated 5/10 in severity.  Afebrile.  Reports constipation.  Last BM was Wed 1/29/25.  Abdomen is distended and has increased pain in the LLQ with palpation.  [facility] and was recommended [facility] per wife in room.  Waiting provider to assess.

## 2025-06-16 ENCOUNTER — OFFICE VISIT (OUTPATIENT)
Dept: CARDIOLOGY CLINIC | Age: 80
End: 2025-06-16
Payer: OTHER GOVERNMENT

## 2025-06-16 VITALS
DIASTOLIC BLOOD PRESSURE: 62 MMHG | SYSTOLIC BLOOD PRESSURE: 124 MMHG | BODY MASS INDEX: 40.35 KG/M2 | HEART RATE: 67 BPM | WEIGHT: 266.2 LBS | HEIGHT: 68 IN

## 2025-06-16 DIAGNOSIS — I25.10 CORONARY ARTERY DISEASE INVOLVING NATIVE CORONARY ARTERY OF NATIVE HEART WITHOUT ANGINA PECTORIS: Primary | ICD-10-CM

## 2025-06-16 PROCEDURE — 93000 ELECTROCARDIOGRAM COMPLETE: CPT | Performed by: INTERNAL MEDICINE

## 2025-06-16 PROCEDURE — 1123F ACP DISCUSS/DSCN MKR DOCD: CPT | Performed by: INTERNAL MEDICINE

## 2025-06-16 PROCEDURE — 1159F MED LIST DOCD IN RCRD: CPT | Performed by: INTERNAL MEDICINE

## 2025-06-16 PROCEDURE — 3078F DIAST BP <80 MM HG: CPT | Performed by: INTERNAL MEDICINE

## 2025-06-16 PROCEDURE — 3074F SYST BP LT 130 MM HG: CPT | Performed by: INTERNAL MEDICINE

## 2025-06-16 PROCEDURE — 99214 OFFICE O/P EST MOD 30 MIN: CPT | Performed by: INTERNAL MEDICINE

## 2025-06-16 RX ORDER — LISINOPRIL 40 MG/1
40 TABLET ORAL DAILY
Qty: 90 TABLET | Refills: 3 | Status: SHIPPED | OUTPATIENT
Start: 2025-06-16

## 2025-06-16 RX ORDER — FUROSEMIDE 20 MG/1
20 TABLET ORAL
Qty: 90 TABLET | Refills: 3 | Status: SHIPPED | OUTPATIENT
Start: 2025-06-16

## 2025-06-16 RX ORDER — CARVEDILOL 6.25 MG/1
6.25 TABLET ORAL 2 TIMES DAILY
Qty: 180 TABLET | Refills: 3 | Status: SHIPPED | OUTPATIENT
Start: 2025-06-16

## 2025-06-16 RX ORDER — ATORVASTATIN CALCIUM 40 MG/1
40 TABLET, FILM COATED ORAL DAILY
Qty: 90 TABLET | Refills: 3 | Status: SHIPPED | OUTPATIENT
Start: 2025-06-16

## 2025-06-16 RX ORDER — SPIRONOLACTONE 25 MG/1
12.5 TABLET ORAL DAILY
Qty: 45 TABLET | Refills: 3 | Status: SHIPPED | OUTPATIENT
Start: 2025-06-16

## 2025-06-16 NOTE — PROGRESS NOTES
[facility] PHYSICIANS [place] SPECIALTY  [facility] CARDIOLOGY  [address]  [address]  [place] [address]  Dept: [phone]  Dept Fax: [phone]  Loc: [phone]    Visit Date: 6/16/2025    Chief Complaint   Patient presents with    Follow-up       HPI:   Mr. Chand is a 80 y.o. male  who presented for:  History of Present Illness  The patient presents for evaluation of congestive heart failure, diabetes, and stroke.    He reports no chest pain but does experience mild shortness of breath, which is not significantly bothersome. His current medication regimen includes aspirin, a cholesterol-lowering drug, carvedilol, spironolactone, and furosemide. He was previously on aspirin 325 mg due to his stroke history but was discontinued following his stent placement.    He has a history of diabetes.    He has a history of stroke.    SOCIAL HISTORY  Exercise: The patient does get out doing his own yard.      Current Outpatient Medications:     lisinopril (PRINIVIL;ZESTRIL) 40 MG tablet, Take 1 tablet by mouth daily, Disp: 90 tablet, Rfl: 1    atorvastatin (LIPITOR) 40 MG tablet, Take 1 tablet by mouth daily, Disp: 90 tablet, Rfl: 3    furosemide (LASIX) 20 MG tablet, Take 1 tablet by mouth three times a week, Disp: 90 tablet, Rfl: 3    spironolactone (ALDACTONE) 25 MG tablet, Take 0.5 tablets by mouth daily, Disp: 45 tablet, Rfl: 3    carvedilol (COREG) 6.25 MG tablet, Take 1 tablet by mouth 2 times daily, Disp: 180 tablet, Rfl: 3    alogliptin (NESINA) 12.5 MG TABS tablet, Take 1 tablet by mouth daily, Disp: , Rfl:     vitamin D 25 MCG (1000 UT) CAPS, Take by mouth, Disp: , Rfl:     allopurinol (ZYLOPRIM) 100 MG tablet, Take 1 tablet by mouth daily, Disp: , Rfl:     aspirin 81 MG tablet, Take 1 tablet by mouth daily, Disp: , Rfl:     terazosin (HYTRIN) 2 MG capsule, Take 1 capsule by mouth nightly, Disp: , Rfl:     fluorometholone (FML) 0.25 % ophthalmic suspension, 1 drop 4 times daily (Patient

## 2025-07-16 ENCOUNTER — OFFICE VISIT (OUTPATIENT)
Age: 80
End: 2025-07-16
Payer: OTHER GOVERNMENT

## 2025-07-16 VITALS
HEIGHT: 68 IN | HEART RATE: 71 BPM | DIASTOLIC BLOOD PRESSURE: 62 MMHG | WEIGHT: 268.4 LBS | SYSTOLIC BLOOD PRESSURE: 114 MMHG | OXYGEN SATURATION: 96 % | TEMPERATURE: 98.2 F | BODY MASS INDEX: 40.68 KG/M2

## 2025-07-16 DIAGNOSIS — G47.33 OBSTRUCTIVE SLEEP APNEA TREATED WITH BILEVEL POSITIVE AIRWAY PRESSURE (BIPAP): Primary | ICD-10-CM

## 2025-07-16 PROCEDURE — 99204 OFFICE O/P NEW MOD 45 MIN: CPT | Performed by: INTERNAL MEDICINE

## 2025-07-16 PROCEDURE — 3074F SYST BP LT 130 MM HG: CPT | Performed by: INTERNAL MEDICINE

## 2025-07-16 PROCEDURE — 3078F DIAST BP <80 MM HG: CPT | Performed by: INTERNAL MEDICINE

## 2025-07-16 PROCEDURE — 1160F RVW MEDS BY RX/DR IN RCRD: CPT | Performed by: INTERNAL MEDICINE

## 2025-07-16 PROCEDURE — 1159F MED LIST DOCD IN RCRD: CPT | Performed by: INTERNAL MEDICINE

## 2025-07-16 PROCEDURE — 1123F ACP DISCUSS/DSCN MKR DOCD: CPT | Performed by: INTERNAL MEDICINE

## 2025-07-16 NOTE — PROGRESS NOTES
[facility]  Sleep Medicine Clinic Note - New Consult  Note written by:   Lucie Garza M.D, [facility]    Critical Care & Pulmonary Medicine   Name : Carlos Chand     : 1945   MR/Act: [id number], Service date : 2025 11:54 AM    PCP  : Kallie Dior MD              Referred by: No att. providers found     Sleep Consult for CANDY. [facility] Result available for review. Is on PAP therapy. [facility]. [facility] by [facility]. Is accompanied by his wife. Neck: 20 inches MP: 4      Chief complaint: Carlos Chand is a 80 y.o.oldmale came for further evaluation regarding   his Confirmed obstructive sleep apnea, hypopnea and nocturnal severe desaturation with hypoxia , Breathing-related sleep disorder and is referred by PCP: Kallie Dior MD,     Symptoms complex when off CPAP  CANDY     witnessed apnea, snoring, choking at night, nocturnal hypoxemia, and restless legs.   Associated Symptoms:morning fatigue, daytime fatigue, nocturia, restless legs, snoring, witnessed apneic episodes, and morning headaches  CPAP: YES  Insomnia  Anxiety about sleep/going to bed present -   difficulty falling asleep, difficulty staying asleep, restless unsatisfying sleep, and early morning waking  Narcolepsy   x  Parasomnias    Nightmares absent  Sleep terrors absent  Sleepwalking absent  Sleep talking absent  Acting out dreams absent  Other unusual behaviors during sleep absent  Restless Leg    Intense, irresistible urge to move the legs, usually associated with sensory complaints (paresthesia or dysesthesia) absent  Motor restlessness present -   Worsening of symptoms at rest and relief with motor activation present -   Increased severity in the evening or at night present -   Periodic limb movements during sleep   Obesity   markedClass II, (severe obesity) BMI 35 to 35.99  Stop Bang   Snore loudly  Tired, fatigues, or daytime

## 2025-07-17 ENCOUNTER — TELEPHONE (OUTPATIENT)
Dept: PULMONOLOGY | Age: 80
End: 2025-07-17

## 2025-07-17 NOTE — TELEPHONE ENCOUNTER
LMTC on Nadine at [facility] voicemail (7/16/25).  Notified her we could not get an in-office download from pt's Helio PAP machine.  Asked for a return call to inform of date of setup for pt.    Received a fax back later in the day that pt has an older machine and the modem has gone bad so they are unable to pull a download, recommending pt bring machine to his visit.    Received call back today (7/17/25) informing pt PAP machine is over the required 5 yr time frame.     An order for a new machine can be faxed to [facility].  Please advise.  Thank you.

## 2025-07-17 NOTE — TELEPHONE ENCOUNTER
Order for new PAP machine and recent office visit note were sent to [facility].

## 2025-07-21 ENCOUNTER — TELEPHONE (OUTPATIENT)
Age: 80
End: 2025-07-21

## 2025-07-21 DIAGNOSIS — F51.02 ADJUSTMENT INSOMNIA: ICD-10-CM

## 2025-07-21 DIAGNOSIS — G47.33 OBSTRUCTIVE SLEEP APNEA: Primary | ICD-10-CM

## 2025-07-21 DIAGNOSIS — R06.83 SNORING: ICD-10-CM

## 2025-07-21 NOTE — TELEPHONE ENCOUNTER
Patient wife called the sleep lab to schedule the spilt night study but there is no Order placed. You saw patient on 7/16 and order a new machine and a split night sleep study but the order is not place. Can you put this order in so Desirae can get that scheduled. Please advise.

## 2025-07-28 ENCOUNTER — HOSPITAL ENCOUNTER (OUTPATIENT)
Dept: SLEEP CENTER | Age: 80
Discharge: HOME | End: 2025-07-30
Payer: OTHER GOVERNMENT

## 2025-07-28 DIAGNOSIS — R06.83 SNORING: ICD-10-CM

## 2025-07-28 DIAGNOSIS — F51.02 ADJUSTMENT INSOMNIA: ICD-10-CM

## 2025-07-28 DIAGNOSIS — G47.33 OBSTRUCTIVE SLEEP APNEA: ICD-10-CM

## 2025-07-28 PROCEDURE — 95811 POLYSOM 6/>YRS CPAP 4/> PARM: CPT

## 2025-08-01 ENCOUNTER — TELEPHONE (OUTPATIENT)
Age: 80
End: 2025-08-01

## 2025-08-01 NOTE — TELEPHONE ENCOUNTER
Received message that patient did not qualify to split. Patient had PSG done 7.28.25. Called and lvm to patient to call back to move up November follow up with  to go over PSG results.

## 2025-08-04 ENCOUNTER — OFFICE VISIT (OUTPATIENT)
Age: 80
End: 2025-08-04
Payer: OTHER GOVERNMENT

## 2025-08-04 VITALS
WEIGHT: 270.2 LBS | DIASTOLIC BLOOD PRESSURE: 68 MMHG | BODY MASS INDEX: 40.95 KG/M2 | SYSTOLIC BLOOD PRESSURE: 116 MMHG | TEMPERATURE: 97.9 F | HEIGHT: 68 IN | OXYGEN SATURATION: 95 % | HEART RATE: 76 BPM

## 2025-08-04 DIAGNOSIS — G47.33 OBSTRUCTIVE SLEEP APNEA TREATED WITH BILEVEL POSITIVE AIRWAY PRESSURE (BIPAP): Primary | ICD-10-CM

## 2025-08-04 PROCEDURE — 99213 OFFICE O/P EST LOW 20 MIN: CPT | Performed by: INTERNAL MEDICINE

## 2025-08-04 PROCEDURE — 1123F ACP DISCUSS/DSCN MKR DOCD: CPT | Performed by: INTERNAL MEDICINE

## 2025-08-04 PROCEDURE — 3078F DIAST BP <80 MM HG: CPT | Performed by: INTERNAL MEDICINE

## 2025-08-04 PROCEDURE — 1159F MED LIST DOCD IN RCRD: CPT | Performed by: INTERNAL MEDICINE

## 2025-08-04 PROCEDURE — 3074F SYST BP LT 130 MM HG: CPT | Performed by: INTERNAL MEDICINE

## 2025-08-04 PROCEDURE — 1160F RVW MEDS BY RX/DR IN RCRD: CPT | Performed by: INTERNAL MEDICINE
